# Patient Record
Sex: MALE | Race: OTHER | HISPANIC OR LATINO | URBAN - METROPOLITAN AREA
[De-identification: names, ages, dates, MRNs, and addresses within clinical notes are randomized per-mention and may not be internally consistent; named-entity substitution may affect disease eponyms.]

---

## 2019-04-14 ENCOUNTER — EMERGENCY (EMERGENCY)
Facility: HOSPITAL | Age: 34
LOS: 1 days | Discharge: HOME | End: 2019-04-14
Attending: STUDENT IN AN ORGANIZED HEALTH CARE EDUCATION/TRAINING PROGRAM
Payer: SELF-PAY

## 2019-04-14 VITALS
HEIGHT: 71 IN | DIASTOLIC BLOOD PRESSURE: 82 MMHG | RESPIRATION RATE: 19 BRPM | HEART RATE: 97 BPM | WEIGHT: 315 LBS | TEMPERATURE: 99 F | OXYGEN SATURATION: 97 % | SYSTOLIC BLOOD PRESSURE: 133 MMHG

## 2019-04-14 PROCEDURE — 99283 EMERGENCY DEPT VISIT LOW MDM: CPT

## 2019-04-14 RX ORDER — CYCLOBENZAPRINE HYDROCHLORIDE 10 MG/1
10 TABLET, FILM COATED ORAL ONCE
Qty: 0 | Refills: 0 | Status: COMPLETED | OUTPATIENT
Start: 2019-04-14 | End: 2019-04-14

## 2019-04-14 RX ORDER — CYCLOBENZAPRINE HYDROCHLORIDE 10 MG/1
1 TABLET, FILM COATED ORAL
Qty: 10 | Refills: 0 | OUTPATIENT
Start: 2019-04-14

## 2019-04-14 RX ORDER — IBUPROFEN 200 MG
600 TABLET ORAL ONCE
Qty: 0 | Refills: 0 | Status: COMPLETED | OUTPATIENT
Start: 2019-04-14 | End: 2019-04-14

## 2019-04-14 RX ORDER — IBUPROFEN 200 MG
1 TABLET ORAL
Qty: 20 | Refills: 0 | OUTPATIENT
Start: 2019-04-14

## 2019-04-14 RX ADMIN — Medication 600 MILLIGRAM(S): at 14:40

## 2019-04-14 RX ADMIN — CYCLOBENZAPRINE HYDROCHLORIDE 10 MILLIGRAM(S): 10 TABLET, FILM COATED ORAL at 14:40

## 2019-04-14 NOTE — ED PROVIDER NOTE - CHPI ED SYMPTOMS NEG
No fever, No vomiting, No difficulty urinating, No difficulty with bowel movements, no b/l leg weakness/no numbness

## 2019-04-14 NOTE — ED PROVIDER NOTE - OBJECTIVE STATEMENT
32 y/o M patient w/ no significant PMHx and no significant PSHx presents to the ED with lower back pain for x1 week. 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 Patient denies any trauma. Patient denies difficulty urinating or with bowel movements, fever, vomiting, b/l leg weakness, b/l leg numbness, and any other complaints. NKDA.

## 2019-04-14 NOTE — ED ADULT NURSE NOTE - OBJECTIVE STATEMENT
AOX4 +ambulatory patient reports lower back pain x 1 week s/p lifting at work. No trauma no gi gu problems

## 2019-04-14 NOTE — ED PROVIDER NOTE - MUSCULOSKELETAL, MLM
Spine appears normal, range of motion is not limited, no muscle or joint tenderness, No midline Cervical, Thoracic or lumbar tenderness to palpation

## 2019-04-14 NOTE — ED PROVIDER NOTE - PROGRESS NOTE DETAILS
patient neurologically intact. ambulatory. pain improved. given medication, return precaution and instructed to f.u pmd

## 2019-04-14 NOTE — ED PROVIDER NOTE - CLINICAL SUMMARY MEDICAL DECISION MAKING FREE TEXT BOX
Patient with back pain worse with movement. Patient neurovascularly intact, no signs of cauda equina, no leg weakness, numbness. Likely MSK pain, no red flags. Will treat pain and reassess.

## 2019-05-17 ENCOUNTER — EMERGENCY (EMERGENCY)
Facility: HOSPITAL | Age: 34
LOS: 1 days | Discharge: HOME | End: 2019-05-17
Attending: EMERGENCY MEDICINE
Payer: SELF-PAY

## 2019-05-17 VITALS
RESPIRATION RATE: 20 BRPM | HEIGHT: 71 IN | TEMPERATURE: 100 F | DIASTOLIC BLOOD PRESSURE: 90 MMHG | SYSTOLIC BLOOD PRESSURE: 150 MMHG | HEART RATE: 136 BPM | WEIGHT: 315 LBS | OXYGEN SATURATION: 94 %

## 2019-05-17 PROCEDURE — 99284 EMERGENCY DEPT VISIT MOD MDM: CPT

## 2019-05-17 RX ORDER — SODIUM CHLORIDE 9 MG/ML
2000 INJECTION INTRAMUSCULAR; INTRAVENOUS; SUBCUTANEOUS ONCE
Refills: 0 | Status: COMPLETED | OUTPATIENT
Start: 2019-05-17 | End: 2019-05-17

## 2019-05-17 RX ORDER — ACETAMINOPHEN 500 MG
1000 TABLET ORAL ONCE
Refills: 0 | Status: COMPLETED | OUTPATIENT
Start: 2019-05-17 | End: 2019-05-18

## 2019-05-17 RX ORDER — KETOROLAC TROMETHAMINE 30 MG/ML
30 SYRINGE (ML) INJECTION ONCE
Refills: 0 | Status: DISCONTINUED | OUTPATIENT
Start: 2019-05-17 | End: 2019-05-17

## 2019-05-18 VITALS
RESPIRATION RATE: 19 BRPM | OXYGEN SATURATION: 98 % | DIASTOLIC BLOOD PRESSURE: 62 MMHG | SYSTOLIC BLOOD PRESSURE: 121 MMHG | TEMPERATURE: 100 F | HEART RATE: 112 BPM

## 2019-05-18 PROBLEM — Z78.9 OTHER SPECIFIED HEALTH STATUS: Chronic | Status: ACTIVE | Noted: 2019-04-14

## 2019-05-18 LAB
ALBUMIN SERPL ELPH-MCNC: 3.4 G/DL — LOW (ref 3.5–5)
ALP SERPL-CCNC: 69 U/L — SIGNIFICANT CHANGE UP (ref 40–120)
ALT FLD-CCNC: 32 U/L DA — SIGNIFICANT CHANGE UP (ref 10–60)
ANION GAP SERPL CALC-SCNC: 6 MMOL/L — SIGNIFICANT CHANGE UP (ref 5–17)
AST SERPL-CCNC: 27 U/L — SIGNIFICANT CHANGE UP (ref 10–40)
BASOPHILS # BLD AUTO: 0 K/UL — SIGNIFICANT CHANGE UP (ref 0–0.2)
BASOPHILS NFR BLD AUTO: 0 % — SIGNIFICANT CHANGE UP (ref 0–2)
BILIRUB SERPL-MCNC: 0.7 MG/DL — SIGNIFICANT CHANGE UP (ref 0.2–1.2)
BUN SERPL-MCNC: 11 MG/DL — SIGNIFICANT CHANGE UP (ref 7–18)
CALCIUM SERPL-MCNC: 8.7 MG/DL — SIGNIFICANT CHANGE UP (ref 8.4–10.5)
CHLORIDE SERPL-SCNC: 105 MMOL/L — SIGNIFICANT CHANGE UP (ref 96–108)
CO2 SERPL-SCNC: 27 MMOL/L — SIGNIFICANT CHANGE UP (ref 22–31)
CREAT SERPL-MCNC: 1.07 MG/DL — SIGNIFICANT CHANGE UP (ref 0.5–1.3)
EOSINOPHIL # BLD AUTO: 0 K/UL — SIGNIFICANT CHANGE UP (ref 0–0.5)
EOSINOPHIL NFR BLD AUTO: 0 % — SIGNIFICANT CHANGE UP (ref 0–6)
GLUCOSE SERPL-MCNC: 102 MG/DL — HIGH (ref 70–99)
HCT VFR BLD CALC: 43.6 % — SIGNIFICANT CHANGE UP (ref 39–50)
HGB BLD-MCNC: 13.8 G/DL — SIGNIFICANT CHANGE UP (ref 13–17)
LYMPHOCYTES # BLD AUTO: 0.27 K/UL — LOW (ref 1–3.3)
LYMPHOCYTES # BLD AUTO: 1 % — LOW (ref 13–44)
MCHC RBC-ENTMCNC: 30.9 PG — SIGNIFICANT CHANGE UP (ref 27–34)
MCHC RBC-ENTMCNC: 31.7 GM/DL — LOW (ref 32–36)
MCV RBC AUTO: 97.5 FL — SIGNIFICANT CHANGE UP (ref 80–100)
MONOCYTES # BLD AUTO: 0.54 K/UL — SIGNIFICANT CHANGE UP (ref 0–0.9)
MONOCYTES NFR BLD AUTO: 2 % — SIGNIFICANT CHANGE UP (ref 2–14)
NEUTROPHILS # BLD AUTO: 25.15 K/UL — HIGH (ref 1.8–7.4)
NEUTROPHILS NFR BLD AUTO: 90 % — HIGH (ref 43–77)
PLATELET # BLD AUTO: 202 K/UL — SIGNIFICANT CHANGE UP (ref 150–400)
POTASSIUM SERPL-MCNC: 3.9 MMOL/L — SIGNIFICANT CHANGE UP (ref 3.5–5.3)
POTASSIUM SERPL-SCNC: 3.9 MMOL/L — SIGNIFICANT CHANGE UP (ref 3.5–5.3)
PROT SERPL-MCNC: 7.8 G/DL — SIGNIFICANT CHANGE UP (ref 6–8.3)
RBC # BLD: 4.47 M/UL — SIGNIFICANT CHANGE UP (ref 4.2–5.8)
RBC # FLD: 12.8 % — SIGNIFICANT CHANGE UP (ref 10.3–14.5)
SODIUM SERPL-SCNC: 138 MMOL/L — SIGNIFICANT CHANGE UP (ref 135–145)
WBC # BLD: 27.04 K/UL — HIGH (ref 3.8–10.5)
WBC # FLD AUTO: 27.04 K/UL — HIGH (ref 3.8–10.5)

## 2019-05-18 PROCEDURE — 99284 EMERGENCY DEPT VISIT MOD MDM: CPT | Mod: 25

## 2019-05-18 PROCEDURE — 87040 BLOOD CULTURE FOR BACTERIA: CPT

## 2019-05-18 PROCEDURE — 96365 THER/PROPH/DIAG IV INF INIT: CPT

## 2019-05-18 PROCEDURE — 36415 COLL VENOUS BLD VENIPUNCTURE: CPT

## 2019-05-18 PROCEDURE — 85027 COMPLETE CBC AUTOMATED: CPT

## 2019-05-18 PROCEDURE — 96366 THER/PROPH/DIAG IV INF ADDON: CPT

## 2019-05-18 PROCEDURE — 80053 COMPREHEN METABOLIC PANEL: CPT

## 2019-05-18 PROCEDURE — 96375 TX/PRO/DX INJ NEW DRUG ADDON: CPT

## 2019-05-18 RX ADMIN — Medication 30 MILLIGRAM(S): at 00:44

## 2019-05-18 RX ADMIN — Medication 400 MILLIGRAM(S): at 00:44

## 2019-05-18 RX ADMIN — SODIUM CHLORIDE 2000 MILLILITER(S): 9 INJECTION INTRAMUSCULAR; INTRAVENOUS; SUBCUTANEOUS at 04:17

## 2019-05-18 RX ADMIN — SODIUM CHLORIDE 2000 MILLILITER(S): 9 INJECTION INTRAMUSCULAR; INTRAVENOUS; SUBCUTANEOUS at 00:20

## 2019-05-18 RX ADMIN — Medication 30 MILLIGRAM(S): at 01:33

## 2019-05-18 RX ADMIN — Medication 1000 MILLIGRAM(S): at 04:17

## 2019-05-18 RX ADMIN — Medication 900 MILLIGRAM(S): at 01:34

## 2019-05-18 RX ADMIN — Medication 100 MILLIGRAM(S): at 00:44

## 2019-05-18 NOTE — ED PROVIDER NOTE - CLINICAL SUMMARY MEDICAL DECISION MAKING FREE TEXT BOX
33 year old male with LLE cellulitis. febrile. tachycardic. PE as above.  labs are significant for leukocytosis. otherwise unremarkable. given dose of clinda and pain control. vitals improved. had detailed convo and offered admission but at this time the patient would like to trial dc home with PO abx. advised strict return precautions if has persistent fevers, worsening pain or if erythema spreads. f/u with PMD in 2 days.

## 2019-05-18 NOTE — ED PROVIDER NOTE - PHYSICAL EXAMINATION
small area of erythema to left anterior tibia that is warm and ttp. LLE N/V intact.  pt ambulatory in ed with steady gait.

## 2019-05-18 NOTE — ED PROVIDER NOTE - OBJECTIVE STATEMENT
33 year old male PMH HTN coming in with redness and pain to LLE over tibia. states similar to prior cellulitis. symptoms started 2 days ago and were associated with fever starting tonight. says last time he had this is was worse and [facility]. denies all other complaints.

## 2019-05-18 NOTE — ED ADULT NURSE NOTE - OBJECTIVE STATEMENT
presents to ed c/o Left lower leg infection.febrile.bld.drawn and  sent to lab. IV ABT clindamycin given as  ordered.

## 2019-05-18 NOTE — ED ADULT NURSE NOTE - NSIMPLEMENTINTERV_GEN_ALL_ED
Implemented All Universal Safety Interventions:  [place] to call system. Call bell, personal items and telephone within reach. Instruct patient to call for assistance. Room bathroom lighting operational. Non-slip footwear when patient is off stretcher. Physically safe environment: no spills, clutter or unnecessary equipment. Stretcher in lowest position, wheels locked, appropriate side rails in place.

## 2019-05-23 LAB
CULTURE RESULTS: SIGNIFICANT CHANGE UP
CULTURE RESULTS: SIGNIFICANT CHANGE UP
SPECIMEN SOURCE: SIGNIFICANT CHANGE UP
SPECIMEN SOURCE: SIGNIFICANT CHANGE UP

## 2019-08-02 PROBLEM — Z00.00 ENCOUNTER FOR PREVENTIVE HEALTH EXAMINATION: Status: ACTIVE | Noted: 2019-08-02

## 2019-08-05 ENCOUNTER — APPOINTMENT (OUTPATIENT)
Dept: ORTHOPEDIC SURGERY | Facility: CLINIC | Age: 34
End: 2019-08-05
Payer: COMMERCIAL

## 2019-08-05 VITALS
WEIGHT: 315 LBS | HEIGHT: 72 IN | SYSTOLIC BLOOD PRESSURE: 165 MMHG | DIASTOLIC BLOOD PRESSURE: 90 MMHG | BODY MASS INDEX: 42.66 KG/M2 | HEART RATE: 91 BPM

## 2019-08-05 DIAGNOSIS — L03.119 CELLULITIS OF UNSPECIFIED PART OF LIMB: ICD-10-CM

## 2019-08-05 DIAGNOSIS — Z78.9 OTHER SPECIFIED HEALTH STATUS: ICD-10-CM

## 2019-08-05 DIAGNOSIS — Z86.79 PERSONAL HISTORY OF OTHER DISEASES OF THE CIRCULATORY SYSTEM: ICD-10-CM

## 2019-08-05 PROCEDURE — 99204 OFFICE O/P NEW MOD 45 MIN: CPT

## 2019-08-05 PROCEDURE — 73564 X-RAY EXAM KNEE 4 OR MORE: CPT | Mod: LT

## 2019-08-05 RX ORDER — DICLOFENAC SODIUM 50 MG/1
50 TABLET, DELAYED RELEASE ORAL
Qty: 60 | Refills: 1 | Status: ACTIVE | COMMUNITY
Start: 2019-08-05 | End: 1900-01-01
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
## 2019-08-05 NOTE — PHYSICAL EXAM
[de-identified] : Physical Examination\par General: well nourished, in no acute distress, alert and oriented to person, place and time\par Psychiatric: normal mood and affect, no abnormal movements or speech patterns\par Eyes: vision intact - glasses\par Throat: no thyromegaly\par Lymph: no enlarged nodes, no lymphedema in extremity\par Respiratory: no wheezing, no shortness of breath with ambulation\par Cardiac: no cardiac leg swelling, 2+ peripheral pulses\par Neurology: normal gross sensation in extremities to light touch\par Abdomen: soft, non-tender, tympanic, no masses\par \par Musculoskeletal Examination\par Ambulation	+ antalgic gait, + assistive devices\par The patient's body habitus synovectomy limited the patient's examination\par Knee			Right			Left\par General\par      Swelling/Deformity	normal			normal	\par      Skin			normal			normal\par      Erythema		-			-\par      Standing Alignment	neutral			neutral\par      Effusion		none			small/moderate\par Range of Motion\par      Hip			full painless ROM		full painless ROM\par      Knee Flexion		100			80\par      Knee Extension	0			0\par Patella\par      J Sign		-			-\par      Quad Medial/Lateral	1/1 1/1\par      Apprehension		-			-\par      Christopher's		-			-\par      Grind Sign		-			-\par      Crepitus		-			-\par Palpation\par      Medial Joint Line	-			+\par      Medial Fem Condyle	-			+ epicondyle and tibial plateau\par      Lateral Joint Line	-			-\par      Quad Tendon		-			-\par      Patella Tendon	-			-\par      Medial Patella		-			-\par      Lateral Patella 	-			-\par      Posterior Knee	-			-\par Ligamentous\par      Varus @ 0° / 30°	-/-			-/-\par      Valgus @ 0° / 30°	-/-			+ no end pt / + no end pt\par      Lachman		-&			-&\par      Pivot Shift		-&			-&\par      Anterior Drawer	-&			-&\par      Posterior Drawer	-&			-&\par Meniscus\par      Zee		-			+ pain\par      Flexion Pinch		-			+ pain\par Strength Examination/Atrophy\par      Hip Flexors 		5+			5+\par      Quadriceps		5+			4+\par      Hamstring		5+			4+\par      Tibialis Anterior	5+			5+\par      Achilles/Soleus	5+			5+\par Sensation\par      Deep Peroneal	normal			normal\par      Superficial Peroneal 	normal			normal\par      Sural  		normal			normal\par      Posterior Tibial 	normal			normal\par      Saphneous 		normal			normal\par Pulses\par      DP			2+			2+\par  [de-identified] : 5 views of the affected Left knee (standing AP, flexing standing AP, 30degree flexed lateral, 0degree lateral, sunrise view)\par were ordered, obtained and evaluated by myself today and\par demonstrate:\par There is mild lateral weightbearing asymmetric narrowing\par Small osteophytic lipping\par Small to trace suprapatellar effusion\par Trace osteophytes with mild patellofemoral joint space loss without evidence of tilt [or] subluxation on sunrise view\par Normal soft tissue density\par Otherwise normal osseous bone structure without fracture or dislocation\par Except for a possible traction avulsion fracture injury at the tibial plateau in the area of the MCL attachment and bony ossicles at the tip of the tibial spines post related to ACL PCL injury

## 2019-09-04 ENCOUNTER — APPOINTMENT (OUTPATIENT)
Dept: ORTHOPEDIC SURGERY | Facility: CLINIC | Age: 34
End: 2019-09-04

## 2019-09-06 ENCOUNTER — APPOINTMENT (OUTPATIENT)
Dept: ORTHOPEDIC SURGERY | Facility: CLINIC | Age: 34
End: 2019-09-06

## 2019-10-07 ENCOUNTER — RX RENEWAL (OUTPATIENT)
Age: 34
End: 2019-10-07

## 2019-10-30 ENCOUNTER — APPOINTMENT (OUTPATIENT)
Dept: ORTHOPEDIC SURGERY | Facility: CLINIC | Age: 34
End: 2019-10-30

## 2019-11-06 ENCOUNTER — APPOINTMENT (OUTPATIENT)
Dept: ORTHOPEDIC SURGERY | Facility: CLINIC | Age: 34
End: 2019-11-06

## 2020-01-03 ENCOUNTER — APPOINTMENT (OUTPATIENT)
Dept: ORTHOPEDIC SURGERY | Facility: CLINIC | Age: 35
End: 2020-01-03
Payer: COMMERCIAL

## 2020-01-03 VITALS — WEIGHT: 315 LBS | BODY MASS INDEX: 44.1 KG/M2 | HEIGHT: 71 IN

## 2020-01-03 DIAGNOSIS — M23.92 UNSPECIFIED INTERNAL DERANGEMENT OF LEFT KNEE: ICD-10-CM

## 2020-01-03 DIAGNOSIS — S83.412A SPRAIN OF MEDIAL COLLATERAL LIGAMENT OF LEFT KNEE, INITIAL ENCOUNTER: ICD-10-CM

## 2020-01-03 DIAGNOSIS — M94.262 CHONDROMALACIA, LEFT KNEE: ICD-10-CM

## 2020-01-03 DIAGNOSIS — S83.105A UNSPECIFIED DISLOCATION OF LEFT KNEE, INITIAL ENCOUNTER: ICD-10-CM

## 2020-01-03 DIAGNOSIS — S89.92XA UNSPECIFIED INJURY OF LEFT LOWER LEG, INITIAL ENCOUNTER: ICD-10-CM

## 2020-01-03 PROCEDURE — 99214 OFFICE O/P EST MOD 30 MIN: CPT

## 2020-01-03 RX ORDER — DICLOFENAC SODIUM 50 MG/1
50 TABLET, DELAYED RELEASE ORAL
Qty: 30 | Refills: 1 | Status: ACTIVE | COMMUNITY
Start: 2020-01-03 | End: 1900-01-01
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
## 2020-01-03 NOTE — PHYSICAL EXAM
[de-identified] : Physical Examination\par General: well nourished, in no acute distress, alert and oriented to person, place and time\par Psychiatric: normal mood and affect, no abnormal movements or speech patterns\par Eyes: vision intact - glasses\par Throat: no thyromegaly\par Lymph: no enlarged nodes, no lymphedema in extremity\par Respiratory: no wheezing, no shortness of breath with ambulation\par Cardiac: no cardiac leg swelling, 2+ peripheral pulses\par Neurology: normal gross sensation in extremities to light touch\par Abdomen: soft, non-tender, tympanic, no masses\par \par Musculoskeletal Examination\par Ambulation	+ antalgic gait, + assistive devices\par The patient's body habitus synovectomy limited the patient's examination\par Knee			Right			Left\par General\par      Swelling/Deformity	normal			normal	\par      Skin			normal			normal\par      Erythema		-			-\par      Standing Alignment	neutral			neutral\par      Effusion		none			small/moderate\par Range of Motion\par      Hip			full painless ROM		full painless ROM\par      Knee Flexion		100			90\par      Knee Extension	0			0\par Patella\par      J Sign		-			-\par      Quad Medial/Lateral	1/1 1/1\par      Apprehension		-			-\par      Christopher's		-			-\par      Grind Sign		-			-\par      Crepitus		-			-\par Palpation\par      Medial Joint Line	-			+\par      Medial Fem Condyle	-			+ epicondyle and tibial plateau\par      Lateral Joint Line	-			-\par      Quad Tendon		-			-\par      Patella Tendon	-			-\par      Medial Patella		-			-\par      Lateral Patella 	-			-\par      Posterior Knee	-			-\par Ligamentous\par      Varus @ 0° / 30°	-/-			-/-\par      Valgus @ 0° / 30°	-/-			+ no end pt / + no end pt\par      Lachman		-&			+ 2B\par      Pivot Shift		-&			-&\par      Anterior Drawer	-&			+\par      Posterior Drawer	-&			+\par Meniscus\par      Zee		-			+ pain\par      Flexion Pinch		-			+ pain\par Strength Examination/Atrophy\par      Hip Flexors 		5+			5+\par      Quadriceps		5+			4+\par      Hamstring		5+			4+\par      Tibialis Anterior	5+			5+\par      Achilles/Soleus	5+			5+\par Sensation\par      Deep Peroneal	normal			normal\par      Superficial Peroneal 	normal			normal\par      Sural  		normal			normal\par      Posterior Tibial 	normal			normal\par      Saphneous 		normal			normal\par Pulses\par      DP			2+			2+\par  [de-identified] : 5 views of the affected Left knee (standing AP, flexing standing AP, 30degree flexed lateral, 0degree lateral, sunrise view)\par 8-2019\par demonstrate:\par There is mild lateral weightbearing asymmetric narrowing\par Small osteophytic lipping\par Small to trace suprapatellar effusion\par Trace osteophytes with mild patellofemoral joint space loss without evidence of tilt [or] subluxation on sunrise view\par Normal soft tissue density\par Otherwise normal osseous bone structure without fracture or dislocation\par Except for a possible traction avulsion fracture injury at the tibial plateau in the area of the MCL attachment and bony ossicles at the tip of the tibial spines post related to ACL PCL injury
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
## 2020-01-04 ENCOUNTER — HOSPITAL ENCOUNTER (INPATIENT)
Dept: HOSPITAL 74 - JER | Age: 35
LOS: 3 days | Discharge: HOME | DRG: 720 | End: 2020-01-07
Attending: FAMILY MEDICINE | Admitting: FAMILY MEDICINE
Payer: COMMERCIAL

## 2020-01-04 VITALS — BODY MASS INDEX: 55.3 KG/M2

## 2020-01-04 DIAGNOSIS — L03.116: ICD-10-CM

## 2020-01-04 DIAGNOSIS — R50.9: ICD-10-CM

## 2020-01-04 DIAGNOSIS — A41.9: Primary | ICD-10-CM

## 2020-01-04 DIAGNOSIS — R00.0: ICD-10-CM

## 2020-01-04 DIAGNOSIS — D72.829: ICD-10-CM

## 2020-01-04 DIAGNOSIS — E66.01: ICD-10-CM

## 2020-01-04 DIAGNOSIS — I10: ICD-10-CM

## 2020-01-04 LAB
ALBUMIN SERPL-MCNC: 3.6 G/DL (ref 3.4–5)
ALP SERPL-CCNC: 76 U/L (ref 45–117)
ALT SERPL-CCNC: 32 U/L (ref 13–61)
ANION GAP SERPL CALC-SCNC: 8 MMOL/L (ref 8–16)
ANISOCYTOSIS BLD QL: 0
APPEARANCE UR: CLEAR
APTT BLD: 30.7 SECONDS (ref 25.2–36.5)
AST SERPL-CCNC: 21 U/L (ref 15–37)
BACTERIA # UR AUTO: 79 /HPF
BASO STIPL BLD QL SMEAR: 0
BASOPHILS # BLD: 0.5 % (ref 0–2)
BILIRUB SERPL-MCNC: 0.6 MG/DL (ref 0.2–1)
BILIRUB UR STRIP.AUTO-MCNC: NEGATIVE MG/DL
BUN SERPL-MCNC: 8.1 MG/DL (ref 7–18)
CALCIUM SERPL-MCNC: 9.3 MG/DL (ref 8.5–10.1)
CASTS URNS QL MICRO: 13 /LPF (ref 0–8)
CHLORIDE SERPL-SCNC: 102 MMOL/L (ref 98–107)
CO2 SERPL-SCNC: 27 MMOL/L (ref 21–32)
COLOR UR: YELLOW
CREAT SERPL-MCNC: 1.1 MG/DL (ref 0.55–1.3)
DACRYOCYTES BLD QL SMEAR: 0
DEPRECATED RDW RBC AUTO: 13.1 % (ref 11.9–15.9)
DOHLE BOD BLD QL SMEAR: 0
EOSINOPHIL # BLD: 0 % (ref 0–4.5)
EPITH CASTS URNS QL MICRO: 1.9 /HPF
GLUCOSE SERPL-MCNC: 104 MG/DL (ref 74–106)
HCT VFR BLD CALC: 43.3 % (ref 35.4–49)
HELMET CELLS BLD QL SMEAR: 0
HGB BLD-MCNC: 14.4 GM/DL (ref 11.7–16.9)
HOWELL-JOLLY BOD BLD QL SMEAR: 0
INR BLD: 1.32 (ref 0.83–1.09)
KETONES UR QL STRIP: (no result)
LEUKOCYTE ESTERASE UR QL STRIP.AUTO: (no result)
LYMPHOCYTES # BLD: 5 % (ref 8–40)
MACROCYTES BLD QL: 0
MAGNESIUM SERPL-MCNC: 2 MG/DL (ref 1.8–2.4)
MCH RBC QN AUTO: 31.4 PG (ref 25.7–33.7)
MCHC RBC AUTO-ENTMCNC: 33.2 G/DL (ref 32–35.9)
MCV RBC: 94.7 FL (ref 80–96)
MONOCYTES # BLD AUTO: 5.7 % (ref 3.8–10.2)
NEUTROPHILS # BLD: 88.8 % (ref 42.8–82.8)
NITRITE UR QL STRIP: NEGATIVE
OVALOCYTES BLD QL SMEAR: 0
PH BLDV: 7.38 [PH] (ref 7.31–7.41)
PH UR: 5.5 [PH] (ref 5–8)
PHOSPHATE SERPL-MCNC: 2.8 MG/DL (ref 2.5–4.9)
PLATELET # BLD AUTO: 246 K/MM3 (ref 134–434)
PLATELET BLD QL SMEAR: NORMAL
PMV BLD: 9.3 FL (ref 7.5–11.1)
POTASSIUM SERPLBLD-SCNC: 4 MMOL/L (ref 3.5–5.1)
PROT SERPL-MCNC: 7.8 G/DL (ref 6.4–8.2)
PROT UR QL STRIP: (no result)
PROT UR QL STRIP: NEGATIVE
PT PNL PPP: 15.6 SEC (ref 9.7–13)
RBC # BLD AUTO: 3 /HPF (ref 0–4)
RBC # BLD AUTO: 4.57 M/MM3 (ref 4–5.6)
ROULEAUX BLD QL SMEAR: 0
SICKLE CELLS BLD QL SMEAR: 0
SODIUM SERPL-SCNC: 137 MMOL/L (ref 136–145)
SP GR UR: 1.02 (ref 1.01–1.03)
TARGETS BLD QL SMEAR: 0
TOXIC GRANULES BLD QL SMEAR: 0
UROBILINOGEN UR STRIP-MCNC: 1 MG/DL (ref 0.2–1)
VENOUS PC02: 50.1 MMHG (ref 38–52)
VENOUS PO2: < 49 MMHG (ref 28–48)
WBC # BLD AUTO: 33.2 K/MM3 (ref 4–10)
WBC # UR AUTO: 7 /HPF (ref 0–5)

## 2020-01-04 RX ADMIN — HEPARIN SODIUM SCH UNIT: 5000 INJECTION, SOLUTION INTRAVENOUS; SUBCUTANEOUS at 23:21

## 2020-01-05 LAB
ALBUMIN SERPL-MCNC: 3.2 G/DL (ref 3.4–5)
ALP SERPL-CCNC: 69 U/L (ref 45–117)
ALT SERPL-CCNC: 28 U/L (ref 13–61)
ANION GAP SERPL CALC-SCNC: 5 MMOL/L (ref 8–16)
AST SERPL-CCNC: 17 U/L (ref 15–37)
BILIRUB SERPL-MCNC: 0.6 MG/DL (ref 0.2–1)
BUN SERPL-MCNC: 8.1 MG/DL (ref 7–18)
CALCIUM SERPL-MCNC: 9 MG/DL (ref 8.5–10.1)
CHLORIDE SERPL-SCNC: 104 MMOL/L (ref 98–107)
CO2 SERPL-SCNC: 31 MMOL/L (ref 21–32)
CREAT SERPL-MCNC: 0.7 MG/DL (ref 0.55–1.3)
DEPRECATED RDW RBC AUTO: 13 % (ref 11.9–15.9)
GLUCOSE SERPL-MCNC: 92 MG/DL (ref 74–106)
HCT VFR BLD CALC: 42.6 % (ref 35.4–49)
HGB BLD-MCNC: 14.3 GM/DL (ref 11.7–16.9)
MCH RBC QN AUTO: 31.9 PG (ref 25.7–33.7)
MCHC RBC AUTO-ENTMCNC: 33.5 G/DL (ref 32–35.9)
MCV RBC: 95.1 FL (ref 80–96)
PLATELET # BLD AUTO: 205 K/MM3 (ref 134–434)
PMV BLD: 9.5 FL (ref 7.5–11.1)
POTASSIUM SERPLBLD-SCNC: 3.8 MMOL/L (ref 3.5–5.1)
PROT SERPL-MCNC: 7.2 G/DL (ref 6.4–8.2)
RBC # BLD AUTO: 4.48 M/MM3 (ref 4–5.6)
SODIUM SERPL-SCNC: 141 MMOL/L (ref 136–145)
WBC # BLD AUTO: 13.3 K/MM3 (ref 4–10)

## 2020-01-05 RX ADMIN — HEPARIN SODIUM SCH UNIT: 5000 INJECTION, SOLUTION INTRAVENOUS; SUBCUTANEOUS at 10:05

## 2020-01-05 RX ADMIN — HEPARIN SODIUM SCH UNIT: 5000 INJECTION, SOLUTION INTRAVENOUS; SUBCUTANEOUS at 21:57

## 2020-01-05 RX ADMIN — CEFAZOLIN SODIUM SCH MLS/HR: 2 SOLUTION INTRAVENOUS at 20:29

## 2020-01-06 RX ADMIN — CEFAZOLIN SODIUM SCH MLS/HR: 2 SOLUTION INTRAVENOUS at 01:57

## 2020-01-06 RX ADMIN — HEPARIN SODIUM SCH UNIT: 5000 INJECTION, SOLUTION INTRAVENOUS; SUBCUTANEOUS at 09:45

## 2020-01-06 RX ADMIN — CEFAZOLIN SODIUM SCH MLS/HR: 2 SOLUTION INTRAVENOUS at 18:07

## 2020-01-06 RX ADMIN — CEFAZOLIN SODIUM SCH MLS/HR: 2 SOLUTION INTRAVENOUS at 09:45

## 2020-01-06 RX ADMIN — HEPARIN SODIUM SCH: 5000 INJECTION, SOLUTION INTRAVENOUS; SUBCUTANEOUS at 21:18

## 2020-01-06 RX ADMIN — LISINOPRIL SCH MG: 5 TABLET ORAL at 16:34

## 2020-01-07 VITALS — DIASTOLIC BLOOD PRESSURE: 67 MMHG | SYSTOLIC BLOOD PRESSURE: 110 MMHG | HEART RATE: 88 BPM | TEMPERATURE: 99.3 F

## 2020-01-07 LAB
ALBUMIN SERPL-MCNC: 3.2 G/DL (ref 3.4–5)
ALP SERPL-CCNC: 63 U/L (ref 45–117)
ALT SERPL-CCNC: 25 U/L (ref 13–61)
ANION GAP SERPL CALC-SCNC: 7 MMOL/L (ref 8–16)
AST SERPL-CCNC: 15 U/L (ref 15–37)
BASOPHILS # BLD: 0.9 % (ref 0–2)
BILIRUB SERPL-MCNC: 0.3 MG/DL (ref 0.2–1)
BUN SERPL-MCNC: 8.1 MG/DL (ref 7–18)
CALCIUM SERPL-MCNC: 8.8 MG/DL (ref 8.5–10.1)
CHLORIDE SERPL-SCNC: 104 MMOL/L (ref 98–107)
CO2 SERPL-SCNC: 27 MMOL/L (ref 21–32)
CREAT SERPL-MCNC: 0.7 MG/DL (ref 0.55–1.3)
DEPRECATED RDW RBC AUTO: 12.9 % (ref 11.9–15.9)
EOSINOPHIL # BLD: 1 % (ref 0–4.5)
GLUCOSE SERPL-MCNC: 89 MG/DL (ref 74–106)
HCT VFR BLD CALC: 40.9 % (ref 35.4–49)
HGB BLD-MCNC: 13.6 GM/DL (ref 11.7–16.9)
LYMPHOCYTES # BLD: 23.7 % (ref 8–40)
MCH RBC QN AUTO: 31.4 PG (ref 25.7–33.7)
MCHC RBC AUTO-ENTMCNC: 33.2 G/DL (ref 32–35.9)
MCV RBC: 94.6 FL (ref 80–96)
MONOCYTES # BLD AUTO: 8.3 % (ref 3.8–10.2)
NEUTROPHILS # BLD: 66.1 % (ref 42.8–82.8)
PLATELET # BLD AUTO: 234 K/MM3 (ref 134–434)
PMV BLD: 9.2 FL (ref 7.5–11.1)
POTASSIUM SERPLBLD-SCNC: 4 MMOL/L (ref 3.5–5.1)
PROT SERPL-MCNC: 7 G/DL (ref 6.4–8.2)
RBC # BLD AUTO: 4.33 M/MM3 (ref 4–5.6)
SODIUM SERPL-SCNC: 138 MMOL/L (ref 136–145)
WBC # BLD AUTO: 12.2 K/MM3 (ref 4–10)

## 2020-01-07 RX ADMIN — LISINOPRIL SCH MG: 5 TABLET ORAL at 11:11

## 2020-01-07 RX ADMIN — CEFAZOLIN SODIUM SCH MLS/HR: 2 SOLUTION INTRAVENOUS at 02:53

## 2020-01-07 RX ADMIN — HEPARIN SODIUM SCH UNIT: 5000 INJECTION, SOLUTION INTRAVENOUS; SUBCUTANEOUS at 11:11

## 2020-01-07 RX ADMIN — CEFAZOLIN SODIUM SCH MLS/HR: 2 SOLUTION INTRAVENOUS at 11:11

## 2020-08-25 ENCOUNTER — EMERGENCY (EMERGENCY)
Facility: HOSPITAL | Age: 35
LOS: 1 days | Discharge: HOME | End: 2020-08-25
Attending: EMERGENCY MEDICINE
Payer: COMMERCIAL

## 2020-08-25 VITALS
HEIGHT: 71 IN | HEART RATE: 122 BPM | SYSTOLIC BLOOD PRESSURE: 133 MMHG | DIASTOLIC BLOOD PRESSURE: 82 MMHG | TEMPERATURE: 98 F | RESPIRATION RATE: 22 BRPM | WEIGHT: 315 LBS | OXYGEN SATURATION: 96 %

## 2020-08-25 VITALS
RESPIRATION RATE: 20 BRPM | DIASTOLIC BLOOD PRESSURE: 84 MMHG | TEMPERATURE: 99 F | OXYGEN SATURATION: 99 % | HEART RATE: 90 BPM | SYSTOLIC BLOOD PRESSURE: 130 MMHG

## 2020-08-25 LAB
ALBUMIN SERPL ELPH-MCNC: 3.2 G/DL — LOW (ref 3.5–5)
ALP SERPL-CCNC: 70 U/L — SIGNIFICANT CHANGE UP (ref 40–120)
ALT FLD-CCNC: 45 U/L DA — SIGNIFICANT CHANGE UP (ref 10–60)
ANION GAP SERPL CALC-SCNC: 6 MMOL/L — SIGNIFICANT CHANGE UP (ref 5–17)
AST SERPL-CCNC: 30 U/L — SIGNIFICANT CHANGE UP (ref 10–40)
BASOPHILS # BLD AUTO: 0 K/UL — SIGNIFICANT CHANGE UP (ref 0–0.2)
BASOPHILS NFR BLD AUTO: 0 % — SIGNIFICANT CHANGE UP (ref 0–2)
BILIRUB SERPL-MCNC: 0.6 MG/DL — SIGNIFICANT CHANGE UP (ref 0.2–1.2)
BUN SERPL-MCNC: 9 MG/DL — SIGNIFICANT CHANGE UP (ref 7–18)
CALCIUM SERPL-MCNC: 9 MG/DL — SIGNIFICANT CHANGE UP (ref 8.4–10.5)
CHLORIDE SERPL-SCNC: 103 MMOL/L — SIGNIFICANT CHANGE UP (ref 96–108)
CO2 SERPL-SCNC: 26 MMOL/L — SIGNIFICANT CHANGE UP (ref 22–31)
CREAT SERPL-MCNC: 0.86 MG/DL — SIGNIFICANT CHANGE UP (ref 0.5–1.3)
EOSINOPHIL # BLD AUTO: 0 K/UL — SIGNIFICANT CHANGE UP (ref 0–0.5)
EOSINOPHIL NFR BLD AUTO: 0 % — SIGNIFICANT CHANGE UP (ref 0–6)
GLUCOSE SERPL-MCNC: 117 MG/DL — HIGH (ref 70–99)
HCT VFR BLD CALC: 43.8 % — SIGNIFICANT CHANGE UP (ref 39–50)
HGB BLD-MCNC: 14.3 G/DL — SIGNIFICANT CHANGE UP (ref 13–17)
LYMPHOCYTES # BLD AUTO: 0.92 K/UL — LOW (ref 1–3.3)
LYMPHOCYTES # BLD AUTO: 3 % — LOW (ref 13–44)
MCHC RBC-ENTMCNC: 32.1 PG — SIGNIFICANT CHANGE UP (ref 27–34)
MCHC RBC-ENTMCNC: 32.6 GM/DL — SIGNIFICANT CHANGE UP (ref 32–36)
MCV RBC AUTO: 98.4 FL — SIGNIFICANT CHANGE UP (ref 80–100)
MONOCYTES # BLD AUTO: 3.99 K/UL — HIGH (ref 0–0.9)
MONOCYTES NFR BLD AUTO: 13 % — SIGNIFICANT CHANGE UP (ref 2–14)
NEUTROPHILS # BLD AUTO: 25.81 K/UL — HIGH (ref 1.8–7.4)
NEUTROPHILS NFR BLD AUTO: 81 % — HIGH (ref 43–77)
PLATELET # BLD AUTO: 229 K/UL — SIGNIFICANT CHANGE UP (ref 150–400)
POTASSIUM SERPL-MCNC: 3.8 MMOL/L — SIGNIFICANT CHANGE UP (ref 3.5–5.3)
POTASSIUM SERPL-SCNC: 3.8 MMOL/L — SIGNIFICANT CHANGE UP (ref 3.5–5.3)
PROT SERPL-MCNC: 8.2 G/DL — SIGNIFICANT CHANGE UP (ref 6–8.3)
RBC # BLD: 4.45 M/UL — SIGNIFICANT CHANGE UP (ref 4.2–5.8)
RBC # FLD: 12.7 % — SIGNIFICANT CHANGE UP (ref 10.3–14.5)
SODIUM SERPL-SCNC: 135 MMOL/L — SIGNIFICANT CHANGE UP (ref 135–145)
TROPONIN I SERPL-MCNC: <0.015 NG/ML — SIGNIFICANT CHANGE UP (ref 0–0.04)
WBC # BLD: 30.73 K/UL — HIGH (ref 3.8–10.5)
WBC # FLD AUTO: 30.73 K/UL — HIGH (ref 3.8–10.5)

## 2020-08-25 PROCEDURE — 71046 X-RAY EXAM CHEST 2 VIEWS: CPT | Mod: 26

## 2020-08-25 PROCEDURE — 99283 EMERGENCY DEPT VISIT LOW MDM: CPT

## 2020-08-25 PROCEDURE — 36415 COLL VENOUS BLD VENIPUNCTURE: CPT

## 2020-08-25 PROCEDURE — 84484 ASSAY OF TROPONIN QUANT: CPT

## 2020-08-25 PROCEDURE — 85027 COMPLETE CBC AUTOMATED: CPT

## 2020-08-25 PROCEDURE — 96374 THER/PROPH/DIAG INJ IV PUSH: CPT

## 2020-08-25 PROCEDURE — 71046 X-RAY EXAM CHEST 2 VIEWS: CPT

## 2020-08-25 PROCEDURE — 99284 EMERGENCY DEPT VISIT MOD MDM: CPT | Mod: 25

## 2020-08-25 PROCEDURE — 80053 COMPREHEN METABOLIC PANEL: CPT

## 2020-08-25 PROCEDURE — 93005 ELECTROCARDIOGRAM TRACING: CPT

## 2020-08-25 RX ORDER — CEFTRIAXONE 500 MG/1
1000 INJECTION, POWDER, FOR SOLUTION INTRAMUSCULAR; INTRAVENOUS ONCE
Refills: 0 | Status: COMPLETED | OUTPATIENT
Start: 2020-08-25 | End: 2020-08-25

## 2020-08-25 RX ORDER — SODIUM CHLORIDE 9 MG/ML
1000 INJECTION INTRAMUSCULAR; INTRAVENOUS; SUBCUTANEOUS ONCE
Refills: 0 | Status: COMPLETED | OUTPATIENT
Start: 2020-08-25 | End: 2020-08-25

## 2020-08-25 RX ADMIN — CEFTRIAXONE 100 MILLIGRAM(S): 500 INJECTION, POWDER, FOR SOLUTION INTRAMUSCULAR; INTRAVENOUS at 08:00

## 2020-08-25 RX ADMIN — SODIUM CHLORIDE 1000 MILLILITER(S): 9 INJECTION INTRAMUSCULAR; INTRAVENOUS; SUBCUTANEOUS at 08:00

## 2020-08-25 NOTE — ED PROVIDER NOTE - PROGRESS NOTE DETAILS
Patient's blood work shows WBC of 30. Discussed at length benefits of admission for further eval. Patient requesting discharge home. Will arrange for outpatient follow up and abx at home. Patient in agreement with plan.

## 2020-08-25 NOTE — ED PROVIDER NOTE - PATIENT PORTAL LINK FT
You can access the FollowMyHealth Patient Portal offered by [facility] by registering at the following website: [email or web address]. By joining [facility]’s FollowMyHealth portal, you will also be able to view your health information using other applications (apps) compatible with our system.

## 2020-08-25 NOTE — ED PROVIDER NOTE - OBJECTIVE STATEMENT
Patient presenting to the ED with concerns for L lower leg cellulitis. Patient reports he has a history of recurring cellulitis and is having symptoms concerning for early infection. States he woke up with pain to the L lateral ankle and it feels swollen to him. States it was associated with chills and he was concerned for a fever. He states when this occurs he develops chest pain. Denies diaphoresis, nausea, vomiting. Denies falls or injuries. h/o HTN. No other issues.

## 2020-08-25 NOTE — ED ADULT NURSE NOTE - OBJECTIVE STATEMENT
presents to the ER c/o pain , warmth to Left lower leg at 4am w/ subjective fevers took some Tylenol . pt reports has chronic cellulitis to same Left leg states " always go to the ER as soon as he feels flare up of cellulitis " denies trauma/injury . also c/o chest pain this morning

## 2021-03-06 ENCOUNTER — HOSPITAL ENCOUNTER (INPATIENT)
Dept: HOSPITAL 74 - JER | Age: 36
LOS: 3 days | Discharge: HOME | DRG: 383 | End: 2021-03-09
Attending: FAMILY MEDICINE | Admitting: HOSPITALIST
Payer: COMMERCIAL

## 2021-03-06 VITALS — BODY MASS INDEX: 60.4 KG/M2

## 2021-03-06 DIAGNOSIS — B96.20: ICD-10-CM

## 2021-03-06 DIAGNOSIS — N40.0: ICD-10-CM

## 2021-03-06 DIAGNOSIS — J06.9: ICD-10-CM

## 2021-03-06 DIAGNOSIS — B35.3: ICD-10-CM

## 2021-03-06 DIAGNOSIS — N39.0: ICD-10-CM

## 2021-03-06 DIAGNOSIS — E66.01: ICD-10-CM

## 2021-03-06 DIAGNOSIS — R31.9: ICD-10-CM

## 2021-03-06 DIAGNOSIS — D72.829: ICD-10-CM

## 2021-03-06 DIAGNOSIS — L03.116: Primary | ICD-10-CM

## 2021-03-06 DIAGNOSIS — I10: ICD-10-CM

## 2021-03-06 LAB
ALBUMIN SERPL-MCNC: 3.6 G/DL (ref 3.4–5)
ALP SERPL-CCNC: 76 U/L (ref 45–117)
ALT SERPL-CCNC: 31 U/L (ref 13–61)
ANION GAP SERPL CALC-SCNC: 5 MMOL/L (ref 8–16)
APPEARANCE UR: CLEAR
AST SERPL-CCNC: 24 U/L (ref 15–37)
BACTERIA # UR AUTO: 160 /UL (ref 0–1359)
BASOPHILS # BLD: 0.2 % (ref 0–2)
BILIRUB SERPL-MCNC: 0.4 MG/DL (ref 0.2–1)
BILIRUB UR STRIP.AUTO-MCNC: NEGATIVE MG/DL
BUN SERPL-MCNC: 11.9 MG/DL (ref 7–18)
CALCIUM SERPL-MCNC: 9.3 MG/DL (ref 8.5–10.1)
CASTS URNS QL MICRO: 30 /UL (ref 0–3.1)
CHLORIDE SERPL-SCNC: 105 MMOL/L (ref 98–107)
CO2 SERPL-SCNC: 27 MMOL/L (ref 21–32)
COLOR UR: YELLOW
CREAT SERPL-MCNC: 0.9 MG/DL (ref 0.55–1.3)
DEPRECATED RDW RBC AUTO: 12.9 % (ref 11.9–15.9)
EOSINOPHIL # BLD: 0.8 % (ref 0–4.5)
EPITH CASTS URNS QL MICRO: 11 /UL (ref 0–25.1)
GLUCOSE SERPL-MCNC: 98 MG/DL (ref 74–106)
HCT VFR BLD CALC: 42.9 % (ref 35.4–49)
HGB BLD-MCNC: 14.6 GM/DL (ref 11.7–16.9)
KETONES UR QL STRIP: (no result)
LEUKOCYTE ESTERASE UR QL STRIP.AUTO: (no result)
LYMPHOCYTES # BLD: 21.3 % (ref 8–40)
MCH RBC QN AUTO: 31.7 PG (ref 25.7–33.7)
MCHC RBC AUTO-ENTMCNC: 34 G/DL (ref 32–35.9)
MCV RBC: 93.3 FL (ref 80–96)
MONOCYTES # BLD AUTO: 8 % (ref 3.8–10.2)
NEUTROPHILS # BLD: 69.7 % (ref 42.8–82.8)
NITRITE UR QL STRIP: NEGATIVE
PH UR: 5.5 [PH] (ref 5–8)
PLATELET # BLD AUTO: 258 K/MM3 (ref 134–434)
PMV BLD: 9 FL (ref 7.5–11.1)
POTASSIUM SERPLBLD-SCNC: 4 MMOL/L (ref 3.5–5.1)
PROT SERPL-MCNC: 8.1 G/DL (ref 6.4–8.2)
PROT UR QL STRIP: NEGATIVE
PROT UR QL STRIP: NEGATIVE
RBC # BLD AUTO: 31 /UL (ref 0–23.9)
RBC # BLD AUTO: 4.6 M/MM3 (ref 4–5.6)
SODIUM SERPL-SCNC: 137 MMOL/L (ref 136–145)
SP GR UR: 1.02 (ref 1.01–1.03)
UROBILINOGEN UR STRIP-MCNC: 0.2 MG/DL (ref 0.2–1)
WBC # BLD AUTO: 15.6 K/MM3 (ref 4–10)
WBC # UR AUTO: 348 /UL (ref 0–25.8)

## 2021-03-06 PROCEDURE — C9803 HOPD COVID-19 SPEC COLLECT: HCPCS

## 2021-03-06 PROCEDURE — U0003 INFECTIOUS AGENT DETECTION BY NUCLEIC ACID (DNA OR RNA); SEVERE ACUTE RESPIRATORY SYNDROME CORONAVIRUS 2 (SARS-COV-2) (CORONAVIRUS DISEASE [COVID-19]), AMPLIFIED PROBE TECHNIQUE, MAKING USE OF HIGH THROUGHPUT TECHNOLOGIES AS DESCRIBED BY CMS-2020-01-R: HCPCS

## 2021-03-07 LAB
ALBUMIN SERPL-MCNC: 3.1 G/DL (ref 3.4–5)
ALP SERPL-CCNC: 62 U/L (ref 45–117)
ALT SERPL-CCNC: 27 U/L (ref 13–61)
ANION GAP SERPL CALC-SCNC: 5 MMOL/L (ref 8–16)
AST SERPL-CCNC: 16 U/L (ref 15–37)
BASOPHILS # BLD: 1.2 % (ref 0–2)
BILIRUB SERPL-MCNC: 0.6 MG/DL (ref 0.2–1)
BUN SERPL-MCNC: 9.2 MG/DL (ref 7–18)
CALCIUM SERPL-MCNC: 8.7 MG/DL (ref 8.5–10.1)
CHLORIDE SERPL-SCNC: 107 MMOL/L (ref 98–107)
CO2 SERPL-SCNC: 27 MMOL/L (ref 21–32)
CREAT SERPL-MCNC: 0.7 MG/DL (ref 0.55–1.3)
DEPRECATED RDW RBC AUTO: 13.1 % (ref 11.9–15.9)
EOSINOPHIL # BLD: 1.5 % (ref 0–4.5)
GLUCOSE SERPL-MCNC: 95 MG/DL (ref 74–106)
HCT VFR BLD CALC: 38.7 % (ref 35.4–49)
HGB BLD-MCNC: 13.1 GM/DL (ref 11.7–16.9)
LYMPHOCYTES # BLD: 23.1 % (ref 8–40)
MCH RBC QN AUTO: 32 PG (ref 25.7–33.7)
MCHC RBC AUTO-ENTMCNC: 33.9 G/DL (ref 32–35.9)
MCV RBC: 94.3 FL (ref 80–96)
MONOCYTES # BLD AUTO: 9.4 % (ref 3.8–10.2)
NEUTROPHILS # BLD: 64.8 % (ref 42.8–82.8)
PLATELET # BLD AUTO: 212 K/MM3 (ref 134–434)
PMV BLD: 9.3 FL (ref 7.5–11.1)
POTASSIUM SERPLBLD-SCNC: 4 MMOL/L (ref 3.5–5.1)
PROT SERPL-MCNC: 6.8 G/DL (ref 6.4–8.2)
RBC # BLD AUTO: 4.11 M/MM3 (ref 4–5.6)
SODIUM SERPL-SCNC: 139 MMOL/L (ref 136–145)
WBC # BLD AUTO: 11.4 K/MM3 (ref 4–10)

## 2021-03-07 RX ADMIN — BENZOCAINE AND MENTHOL PRN EACH: 15; 3.6 LOZENGE ORAL at 10:48

## 2021-03-07 RX ADMIN — LISINOPRIL SCH MG: 5 TABLET ORAL at 10:48

## 2021-03-07 RX ADMIN — CEFAZOLIN SODIUM SCH MLS/HR: 2 SOLUTION INTRAVENOUS at 18:16

## 2021-03-07 RX ADMIN — TOLNAFTATE SCH APPLIC: 1 POWDER TOPICAL at 22:20

## 2021-03-07 RX ADMIN — CEFAZOLIN SODIUM SCH MLS/HR: 2 SOLUTION INTRAVENOUS at 10:47

## 2021-03-07 RX ADMIN — BENZOCAINE AND MENTHOL PRN EACH: 15; 3.6 LOZENGE ORAL at 06:15

## 2021-03-08 LAB
ALBUMIN SERPL-MCNC: 3.2 G/DL (ref 3.4–5)
ALP SERPL-CCNC: 62 U/L (ref 45–117)
ALT SERPL-CCNC: 28 U/L (ref 13–61)
ANION GAP SERPL CALC-SCNC: 4 MMOL/L (ref 8–16)
AST SERPL-CCNC: 21 U/L (ref 15–37)
BILIRUB SERPL-MCNC: 0.5 MG/DL (ref 0.2–1)
BUN SERPL-MCNC: 9.2 MG/DL (ref 7–18)
CALCIUM SERPL-MCNC: 8.8 MG/DL (ref 8.5–10.1)
CHLORIDE SERPL-SCNC: 105 MMOL/L (ref 98–107)
CO2 SERPL-SCNC: 29 MMOL/L (ref 21–32)
CREAT SERPL-MCNC: 0.8 MG/DL (ref 0.55–1.3)
DEPRECATED RDW RBC AUTO: 13.2 % (ref 11.9–15.9)
GLUCOSE SERPL-MCNC: 110 MG/DL (ref 74–106)
HCT VFR BLD CALC: 39.1 % (ref 35.4–49)
HGB BLD-MCNC: 13.4 GM/DL (ref 11.7–16.9)
HIV 1+2 AB+HIV1 P24 AG SERPL QL IA: NEGATIVE
MCH RBC QN AUTO: 32.1 PG (ref 25.7–33.7)
MCHC RBC AUTO-ENTMCNC: 34.1 G/DL (ref 32–35.9)
MCV RBC: 93.9 FL (ref 80–96)
PLATELET # BLD AUTO: 246 K/MM3 (ref 134–434)
PMV BLD: 8.8 FL (ref 7.5–11.1)
POTASSIUM SERPLBLD-SCNC: 4 MMOL/L (ref 3.5–5.1)
PROT SERPL-MCNC: 7.1 G/DL (ref 6.4–8.2)
RBC # BLD AUTO: 4.16 M/MM3 (ref 4–5.6)
SODIUM SERPL-SCNC: 137 MMOL/L (ref 136–145)
WBC # BLD AUTO: 11.1 K/MM3 (ref 4–10)

## 2021-03-08 RX ADMIN — LISINOPRIL SCH MG: 5 TABLET ORAL at 10:01

## 2021-03-08 RX ADMIN — TOLNAFTATE SCH APPLIC: 1 POWDER TOPICAL at 10:02

## 2021-03-08 RX ADMIN — TOLNAFTATE SCH APPLIC: 1 POWDER TOPICAL at 22:53

## 2021-03-08 RX ADMIN — CEFAZOLIN SODIUM SCH MLS/HR: 2 SOLUTION INTRAVENOUS at 10:01

## 2021-03-08 RX ADMIN — CEFAZOLIN SODIUM SCH MLS/HR: 2 SOLUTION INTRAVENOUS at 02:10

## 2021-03-08 RX ADMIN — CEFTRIAXONE SODIUM SCH MLS/HR: 2 INJECTION, POWDER, FOR SOLUTION INTRAMUSCULAR; INTRAVENOUS at 17:17

## 2021-03-08 RX ADMIN — BENZOCAINE AND MENTHOL PRN EACH: 15; 3.6 LOZENGE ORAL at 21:56

## 2021-03-09 VITALS — DIASTOLIC BLOOD PRESSURE: 81 MMHG | SYSTOLIC BLOOD PRESSURE: 132 MMHG | HEART RATE: 86 BPM

## 2021-03-09 VITALS — TEMPERATURE: 97.8 F

## 2021-03-09 RX ADMIN — LISINOPRIL SCH MG: 5 TABLET ORAL at 10:05

## 2021-03-09 RX ADMIN — TOLNAFTATE SCH APPLIC: 1 POWDER TOPICAL at 10:05

## 2021-03-09 RX ADMIN — CEFTRIAXONE SODIUM SCH MLS/HR: 2 INJECTION, POWDER, FOR SOLUTION INTRAMUSCULAR; INTRAVENOUS at 09:58

## 2021-04-30 ENCOUNTER — HOSPITAL ENCOUNTER (INPATIENT)
Dept: HOSPITAL 74 - JER | Age: 36
LOS: 4 days | Discharge: HOME | DRG: 383 | End: 2021-05-04
Attending: FAMILY MEDICINE | Admitting: HOSPITALIST
Payer: COMMERCIAL

## 2021-04-30 VITALS — BODY MASS INDEX: 60.2 KG/M2

## 2021-04-30 DIAGNOSIS — E66.01: ICD-10-CM

## 2021-04-30 DIAGNOSIS — B35.3: ICD-10-CM

## 2021-04-30 DIAGNOSIS — L03.116: Primary | ICD-10-CM

## 2021-04-30 DIAGNOSIS — I10: ICD-10-CM

## 2021-04-30 LAB
ALBUMIN SERPL-MCNC: 3.6 G/DL (ref 3.4–5)
ALP SERPL-CCNC: 67 U/L (ref 45–117)
ALT SERPL-CCNC: 36 U/L (ref 13–61)
ANION GAP SERPL CALC-SCNC: 8 MMOL/L (ref 8–16)
ANISOCYTOSIS BLD QL: 0
APTT BLD: 26.8 SECONDS (ref 25.2–36.5)
AST SERPL-CCNC: 27 U/L (ref 15–37)
BASE EXCESS BLDV CALC-SCNC: -1 MMOL/L (ref -2–2)
BASOPHILS # BLD: 0.6 % (ref 0–2)
BILIRUB SERPL-MCNC: 0.6 MG/DL (ref 0.2–1)
BUN SERPL-MCNC: 9.9 MG/DL (ref 7–18)
CALCIUM SERPL-MCNC: 9 MG/DL (ref 8.5–10.1)
CHLORIDE SERPL-SCNC: 103 MMOL/L (ref 98–107)
CO2 SERPL-SCNC: 25 MMOL/L (ref 21–32)
CREAT SERPL-MCNC: 1 MG/DL (ref 0.55–1.3)
DEPRECATED RDW RBC AUTO: 13.7 % (ref 11.9–15.9)
EOSINOPHIL # BLD: 0 % (ref 0–4.5)
ERYTHROCYTE [SEDIMENTATION RATE] IN BLOOD BY WESTERGREN METHOD: 25 MM/HR (ref 0–10)
GLUCOSE SERPL-MCNC: 91 MG/DL (ref 74–106)
HCT VFR BLD CALC: 43 % (ref 35.4–49)
HGB BLD-MCNC: 14.6 GM/DL (ref 11.7–16.9)
INR BLD: 1.13 (ref 0.83–1.09)
LYMPHOCYTES # BLD: 2.6 % (ref 8–40)
MACROCYTES BLD QL: 0
MAGNESIUM SERPL-MCNC: 1.8 MG/DL (ref 1.8–2.4)
MCH RBC QN AUTO: 32.2 PG (ref 25.7–33.7)
MCHC RBC AUTO-ENTMCNC: 33.9 G/DL (ref 32–35.9)
MCV RBC: 95 FL (ref 80–96)
MONOCYTES # BLD AUTO: 4.9 % (ref 3.8–10.2)
NEUTROPHILS # BLD: 91.9 % (ref 42.8–82.8)
PCO2 BLDV: 34.9 MMHG (ref 38–52)
PH BLDV: 7.43 [PH] (ref 7.31–7.41)
PLATELET # BLD AUTO: 216 K/MM3 (ref 134–434)
PLATELET BLD QL SMEAR: NORMAL
PMV BLD: 9.7 FL (ref 7.5–11.1)
PROT SERPL-MCNC: 7.6 G/DL (ref 6.4–8.2)
PT PNL PPP: 13.8 SEC (ref 9.7–13)
RBC # BLD AUTO: 4.53 M/MM3 (ref 4–5.6)
SAO2 % BLDV: 86.5 % (ref 70–80)
SODIUM SERPL-SCNC: 137 MMOL/L (ref 136–145)
WBC # BLD AUTO: 34.9 K/MM3 (ref 4–10)

## 2021-04-30 PROCEDURE — G0480 DRUG TEST DEF 1-7 CLASSES: HCPCS

## 2021-04-30 PROCEDURE — U0005 INFEC AGEN DETEC AMPLI PROBE: HCPCS

## 2021-04-30 PROCEDURE — U0003 INFECTIOUS AGENT DETECTION BY NUCLEIC ACID (DNA OR RNA); SEVERE ACUTE RESPIRATORY SYNDROME CORONAVIRUS 2 (SARS-COV-2) (CORONAVIRUS DISEASE [COVID-19]), AMPLIFIED PROBE TECHNIQUE, MAKING USE OF HIGH THROUGHPUT TECHNOLOGIES AS DESCRIBED BY CMS-2020-01-R: HCPCS

## 2021-04-30 PROCEDURE — C9803 HOPD COVID-19 SPEC COLLECT: HCPCS

## 2021-04-30 RX ADMIN — ENOXAPARIN SODIUM SCH: 40 INJECTION SUBCUTANEOUS at 21:29

## 2021-04-30 RX ADMIN — SODIUM CHLORIDE SCH MLS/HR: 9 INJECTION, SOLUTION INTRAVENOUS at 21:08

## 2021-05-01 LAB
ANION GAP SERPL CALC-SCNC: 5 MMOL/L (ref 8–16)
ANISOCYTOSIS BLD QL: 0
APPEARANCE UR: (no result)
BACTERIA # UR AUTO: 52 /UL (ref 0–1359)
BASO STIPL BLD QL SMEAR: 0
BASOPHILS # BLD: 0.5 % (ref 0–2)
BILIRUB UR STRIP.AUTO-MCNC: NEGATIVE MG/DL
BUN SERPL-MCNC: 9.3 MG/DL (ref 7–18)
CALCIUM SERPL-MCNC: 8.2 MG/DL (ref 8.5–10.1)
CASTS URNS QL MICRO: 38 /UL (ref 0–3.1)
CHLORIDE SERPL-SCNC: 106 MMOL/L (ref 98–107)
CO2 SERPL-SCNC: 27 MMOL/L (ref 21–32)
COLOR UR: (no result)
CREAT SERPL-MCNC: 0.7 MG/DL (ref 0.55–1.3)
DACRYOCYTES BLD QL SMEAR: 0
DEPRECATED RDW RBC AUTO: 13.4 % (ref 11.9–15.9)
DOHLE BOD BLD QL SMEAR: 0
EOSINOPHIL # BLD: 0.3 % (ref 0–4.5)
EPITH CASTS URNS QL MICRO: >36 /UL (ref 0–25.1)
GLUCOSE SERPL-MCNC: 95 MG/DL (ref 74–106)
HCT VFR BLD CALC: 39.7 % (ref 35.4–49)
HELMET CELLS BLD QL SMEAR: 0
HGB BLD-MCNC: 13.5 GM/DL (ref 11.7–16.9)
HOWELL-JOLLY BOD BLD QL SMEAR: 0
KETONES UR QL STRIP: (no result)
LACTATE SERPL-MCNC: 3.2 MMOL/L (ref 0.4–2)
LEUKOCYTE ESTERASE UR QL STRIP.AUTO: (no result)
LYMPHOCYTES # BLD: 5.6 % (ref 8–40)
MACROCYTES BLD QL: 0
MAGNESIUM SERPL-MCNC: 2 MG/DL (ref 1.8–2.4)
MCH RBC QN AUTO: 32.3 PG (ref 25.7–33.7)
MCHC RBC AUTO-ENTMCNC: 34 G/DL (ref 32–35.9)
MCV RBC: 95 FL (ref 80–96)
MONOCYTES # BLD AUTO: 4.8 % (ref 3.8–10.2)
NEUTROPHILS # BLD: 88.8 % (ref 42.8–82.8)
NITRITE UR QL STRIP: NEGATIVE
OVALOCYTES BLD QL SMEAR: 0
PH UR: 5.5 [PH] (ref 5–8)
PHOSPHATE SERPL-MCNC: 2.8 MG/DL (ref 2.5–4.9)
PLATELET # BLD AUTO: 191 K/MM3 (ref 134–434)
PLATELET BLD QL SMEAR: NORMAL
PMV BLD: 9.7 FL (ref 7.5–11.1)
PROT UR QL STRIP: (no result)
PROT UR QL STRIP: NEGATIVE
RBC # BLD AUTO: 20 /UL (ref 0–23.9)
RBC # BLD AUTO: 4.18 M/MM3 (ref 4–5.6)
ROULEAUX BLD QL SMEAR: 0
SICKLE CELLS BLD QL SMEAR: 0
SODIUM SERPL-SCNC: 138 MMOL/L (ref 136–145)
SP GR UR: 1.04 (ref 1.01–1.03)
TARGETS BLD QL SMEAR: 0
TOXIC GRANULES BLD QL SMEAR: 0
UROBILINOGEN UR STRIP-MCNC: 1 MG/DL (ref 0.2–1)
WBC # BLD AUTO: 25.2 K/MM3 (ref 4–10)
WBC # UR AUTO: 334 /UL (ref 0–25.8)

## 2021-05-01 RX ADMIN — CEFAZOLIN SODIUM SCH MLS/HR: 2 SOLUTION INTRAVENOUS at 14:05

## 2021-05-01 RX ADMIN — ACETAMINOPHEN PRN MG: 325 TABLET ORAL at 18:32

## 2021-05-01 RX ADMIN — LISINOPRIL SCH MG: 5 TABLET ORAL at 09:11

## 2021-05-01 RX ADMIN — ACETAMINOPHEN PRN MG: 325 TABLET ORAL at 02:41

## 2021-05-01 RX ADMIN — ENOXAPARIN SODIUM SCH: 40 INJECTION SUBCUTANEOUS at 09:15

## 2021-05-01 RX ADMIN — CEFAZOLIN SODIUM SCH MLS/HR: 2 SOLUTION INTRAVENOUS at 17:09

## 2021-05-01 RX ADMIN — VANCOMYCIN SCH MLS/HR: 1.5 INJECTION, SOLUTION INTRAVENOUS at 18:29

## 2021-05-01 RX ADMIN — ACETAMINOPHEN PRN MG: 325 TABLET ORAL at 12:21

## 2021-05-01 RX ADMIN — SODIUM CHLORIDE SCH MLS/HR: 9 INJECTION, SOLUTION INTRAVENOUS at 17:12

## 2021-05-02 LAB
ANION GAP SERPL CALC-SCNC: 6 MMOL/L (ref 8–16)
BUN SERPL-MCNC: 7.4 MG/DL (ref 7–18)
CALCIUM SERPL-MCNC: 8.2 MG/DL (ref 8.5–10.1)
CHLORIDE SERPL-SCNC: 107 MMOL/L (ref 98–107)
CO2 SERPL-SCNC: 26 MMOL/L (ref 21–32)
CREAT SERPL-MCNC: 0.7 MG/DL (ref 0.55–1.3)
DEPRECATED RDW RBC AUTO: 13.5 % (ref 11.9–15.9)
GLUCOSE SERPL-MCNC: 96 MG/DL (ref 74–106)
HCT VFR BLD CALC: 39.7 % (ref 35.4–49)
HGB BLD-MCNC: 13.3 GM/DL (ref 11.7–16.9)
MCH RBC QN AUTO: 32.2 PG (ref 25.7–33.7)
MCHC RBC AUTO-ENTMCNC: 33.4 G/DL (ref 32–35.9)
MCV RBC: 96.3 FL (ref 80–96)
PLATELET # BLD AUTO: 200 K/MM3 (ref 134–434)
PMV BLD: 9.5 FL (ref 7.5–11.1)
RBC # BLD AUTO: 4.12 M/MM3 (ref 4–5.6)
SODIUM SERPL-SCNC: 139 MMOL/L (ref 136–145)
WBC # BLD AUTO: 14 K/MM3 (ref 4–10)

## 2021-05-02 RX ADMIN — SODIUM CHLORIDE SCH: 9 INJECTION, SOLUTION INTRAVENOUS at 01:55

## 2021-05-02 RX ADMIN — CEFAZOLIN SODIUM SCH MLS/HR: 2 SOLUTION INTRAVENOUS at 01:54

## 2021-05-02 RX ADMIN — ACETAMINOPHEN PRN MG: 325 TABLET ORAL at 09:26

## 2021-05-02 RX ADMIN — ENOXAPARIN SODIUM SCH: 40 INJECTION SUBCUTANEOUS at 09:28

## 2021-05-02 RX ADMIN — SODIUM CHLORIDE SCH MLS/HR: 9 INJECTION, SOLUTION INTRAVENOUS at 23:07

## 2021-05-02 RX ADMIN — CEFAZOLIN SODIUM SCH MLS/HR: 2 SOLUTION INTRAVENOUS at 09:25

## 2021-05-02 RX ADMIN — ACETAMINOPHEN PRN MG: 325 TABLET ORAL at 03:23

## 2021-05-02 RX ADMIN — CEFAZOLIN SODIUM SCH MLS/HR: 2 SOLUTION INTRAVENOUS at 17:02

## 2021-05-02 RX ADMIN — LISINOPRIL SCH MG: 5 TABLET ORAL at 09:25

## 2021-05-02 RX ADMIN — VANCOMYCIN SCH MLS/HR: 1.5 INJECTION, SOLUTION INTRAVENOUS at 06:41

## 2021-05-02 RX ADMIN — ACETAMINOPHEN AND CODEINE PHOSPHATE PRN TAB: 300; 30 TABLET ORAL at 17:05

## 2021-05-02 RX ADMIN — ACETAMINOPHEN AND CODEINE PHOSPHATE PRN TAB: 300; 30 TABLET ORAL at 23:07

## 2021-05-03 LAB
ALBUMIN SERPL-MCNC: 3.1 G/DL (ref 3.4–5)
ALP SERPL-CCNC: 61 U/L (ref 45–117)
ALT SERPL-CCNC: 22 U/L (ref 13–61)
ANION GAP SERPL CALC-SCNC: 5 MMOL/L (ref 8–16)
AST SERPL-CCNC: 18 U/L (ref 15–37)
BASOPHILS # BLD: 1 % (ref 0–2)
BILIRUB SERPL-MCNC: 0.5 MG/DL (ref 0.2–1)
BUN SERPL-MCNC: 8.8 MG/DL (ref 7–18)
CALCIUM SERPL-MCNC: 8.9 MG/DL (ref 8.5–10.1)
CHLORIDE SERPL-SCNC: 105 MMOL/L (ref 98–107)
CO2 SERPL-SCNC: 29 MMOL/L (ref 21–32)
CREAT SERPL-MCNC: 0.7 MG/DL (ref 0.55–1.3)
DEPRECATED RDW RBC AUTO: 13.5 % (ref 11.9–15.9)
EOSINOPHIL # BLD: 1.2 % (ref 0–4.5)
GLUCOSE SERPL-MCNC: 98 MG/DL (ref 74–106)
HCT VFR BLD CALC: 40.8 % (ref 35.4–49)
HGB BLD-MCNC: 14 GM/DL (ref 11.7–16.9)
LYMPHOCYTES # BLD: 24.1 % (ref 8–40)
MCH RBC QN AUTO: 32.8 PG (ref 25.7–33.7)
MCHC RBC AUTO-ENTMCNC: 34.4 G/DL (ref 32–35.9)
MCV RBC: 95.3 FL (ref 80–96)
MONOCYTES # BLD AUTO: 8.5 % (ref 3.8–10.2)
NEUTROPHILS # BLD: 65.2 % (ref 42.8–82.8)
PLATELET # BLD AUTO: 223 K/MM3 (ref 134–434)
PMV BLD: 9 FL (ref 7.5–11.1)
PROT SERPL-MCNC: 7 G/DL (ref 6.4–8.2)
RBC # BLD AUTO: 4.28 M/MM3 (ref 4–5.6)
SODIUM SERPL-SCNC: 139 MMOL/L (ref 136–145)
WBC # BLD AUTO: 10.3 K/MM3 (ref 4–10)

## 2021-05-03 RX ADMIN — SODIUM CHLORIDE SCH MLS/HR: 9 INJECTION, SOLUTION INTRAVENOUS at 13:30

## 2021-05-03 RX ADMIN — CEFAZOLIN SODIUM SCH MLS/HR: 2 SOLUTION INTRAVENOUS at 17:33

## 2021-05-03 RX ADMIN — CEFAZOLIN SODIUM SCH MLS/HR: 2 SOLUTION INTRAVENOUS at 09:22

## 2021-05-03 RX ADMIN — ENOXAPARIN SODIUM SCH MG: 40 INJECTION SUBCUTANEOUS at 09:22

## 2021-05-03 RX ADMIN — CEFAZOLIN SODIUM SCH MLS/HR: 2 SOLUTION INTRAVENOUS at 01:22

## 2021-05-03 RX ADMIN — LISINOPRIL SCH MG: 5 TABLET ORAL at 09:23

## 2021-05-04 VITALS — TEMPERATURE: 98.5 F | SYSTOLIC BLOOD PRESSURE: 163 MMHG | DIASTOLIC BLOOD PRESSURE: 91 MMHG | HEART RATE: 88 BPM

## 2021-05-04 RX ADMIN — SODIUM CHLORIDE SCH MLS/HR: 9 INJECTION, SOLUTION INTRAVENOUS at 01:37

## 2021-05-04 RX ADMIN — CEFAZOLIN SODIUM SCH MLS/HR: 2 SOLUTION INTRAVENOUS at 09:21

## 2021-05-04 RX ADMIN — CEFAZOLIN SODIUM SCH MLS/HR: 2 SOLUTION INTRAVENOUS at 01:37

## 2021-05-04 RX ADMIN — LISINOPRIL SCH MG: 5 TABLET ORAL at 09:21

## 2021-05-04 RX ADMIN — ENOXAPARIN SODIUM SCH MG: 40 INJECTION SUBCUTANEOUS at 09:21
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
## 2021-08-30 ENCOUNTER — HOSPITAL ENCOUNTER (INPATIENT)
Dept: HOSPITAL 74 - JER | Age: 36
LOS: 4 days | Discharge: HOME | DRG: 383 | End: 2021-09-03
Attending: FAMILY MEDICINE | Admitting: HOSPITALIST
Payer: COMMERCIAL

## 2021-08-30 VITALS — BODY MASS INDEX: 64.3 KG/M2

## 2021-08-30 DIAGNOSIS — R60.9: ICD-10-CM

## 2021-08-30 DIAGNOSIS — D72.829: ICD-10-CM

## 2021-08-30 DIAGNOSIS — F17.200: ICD-10-CM

## 2021-08-30 DIAGNOSIS — E66.01: ICD-10-CM

## 2021-08-30 DIAGNOSIS — I10: ICD-10-CM

## 2021-08-30 DIAGNOSIS — L03.116: Primary | ICD-10-CM

## 2021-08-30 DIAGNOSIS — R00.0: ICD-10-CM

## 2021-08-30 DIAGNOSIS — R50.9: ICD-10-CM

## 2021-08-30 LAB
ALBUMIN SERPL-MCNC: 3 G/DL (ref 3.4–5)
ALP SERPL-CCNC: 71 U/L (ref 45–117)
ALT SERPL-CCNC: 29 U/L (ref 13–61)
ANION GAP SERPL CALC-SCNC: 7 MMOL/L (ref 8–16)
ANISOCYTOSIS BLD QL: 0
AST SERPL-CCNC: 18 U/L (ref 15–37)
BASO STIPL BLD QL SMEAR: 0
BILIRUB DIRECT SERPL-MCNC: 243 U/L (ref 87–246)
BILIRUB SERPL-MCNC: 0.6 MG/DL (ref 0.2–1)
BUN SERPL-MCNC: 11.7 MG/DL (ref 7–18)
CALCIUM SERPL-MCNC: 8.7 MG/DL (ref 8.5–10.1)
CHLORIDE SERPL-SCNC: 105 MMOL/L (ref 98–107)
CO2 SERPL-SCNC: 27 MMOL/L (ref 21–32)
CREAT SERPL-MCNC: 0.9 MG/DL (ref 0.55–1.3)
DACRYOCYTES BLD QL SMEAR: 0
DEPRECATED RDW RBC AUTO: 13.6 % (ref 11.9–15.9)
DOHLE BOD BLD QL SMEAR: 0
GLUCOSE SERPL-MCNC: 113 MG/DL (ref 74–106)
HCT VFR BLD CALC: 40.9 % (ref 35.4–49)
HELMET CELLS BLD QL SMEAR: 0
HGB BLD-MCNC: 13.7 GM/DL (ref 11.7–16.9)
HOWELL-JOLLY BOD BLD QL SMEAR: 0
MACROCYTES BLD QL: 0
MCH RBC QN AUTO: 31.4 PG (ref 25.7–33.7)
MCHC RBC AUTO-ENTMCNC: 33.5 G/DL (ref 32–35.9)
MCV RBC: 93.9 FL (ref 80–96)
OVALOCYTES BLD QL SMEAR: 0
PLATELET # BLD AUTO: 218 10^3/UL (ref 134–434)
PLATELET BLD QL SMEAR: NORMAL
PMV BLD: 8.8 FL (ref 7.5–11.1)
PROT SERPL-MCNC: 7.4 G/DL (ref 6.4–8.2)
RBC # BLD AUTO: 4.35 M/MM3 (ref 4–5.6)
ROULEAUX BLD QL SMEAR: 0
SICKLE CELLS BLD QL SMEAR: 0
SODIUM SERPL-SCNC: 139 MMOL/L (ref 136–145)
TARGETS BLD QL SMEAR: 0
TOXIC GRANULES BLD QL SMEAR: 0
WBC # BLD AUTO: 23 K/MM3 (ref 4–10)

## 2021-08-30 PROCEDURE — C9803 HOPD COVID-19 SPEC COLLECT: HCPCS

## 2021-08-30 PROCEDURE — U0005 INFEC AGEN DETEC AMPLI PROBE: HCPCS

## 2021-08-30 PROCEDURE — U0003 INFECTIOUS AGENT DETECTION BY NUCLEIC ACID (DNA OR RNA); SEVERE ACUTE RESPIRATORY SYNDROME CORONAVIRUS 2 (SARS-COV-2) (CORONAVIRUS DISEASE [COVID-19]), AMPLIFIED PROBE TECHNIQUE, MAKING USE OF HIGH THROUGHPUT TECHNOLOGIES AS DESCRIBED BY CMS-2020-01-R: HCPCS

## 2021-08-30 RX ADMIN — KETOCONAZOLE SCH TUBE: 20 CREAM TOPICAL at 21:30

## 2021-08-30 RX ADMIN — ENOXAPARIN SODIUM SCH MG: 40 INJECTION SUBCUTANEOUS at 21:30

## 2021-08-30 RX ADMIN — CEFAZOLIN SCH MLS/HR: 1 INJECTION, POWDER, FOR SOLUTION INTRAVENOUS at 21:30

## 2021-08-31 LAB
ALBUMIN SERPL-MCNC: 2.7 G/DL (ref 3.4–5)
ALP SERPL-CCNC: 59 U/L (ref 45–117)
ALT SERPL-CCNC: 31 U/L (ref 13–61)
ANION GAP SERPL CALC-SCNC: 5 MMOL/L (ref 8–16)
AST SERPL-CCNC: 22 U/L (ref 15–37)
BILIRUB DIRECT SERPL-MCNC: 181 U/L (ref 87–246)
BILIRUB SERPL-MCNC: 0.6 MG/DL (ref 0.2–1)
BUN SERPL-MCNC: 9.5 MG/DL (ref 7–18)
CALCIUM SERPL-MCNC: 8.4 MG/DL (ref 8.5–10.1)
CHLORIDE SERPL-SCNC: 106 MMOL/L (ref 98–107)
CO2 SERPL-SCNC: 27 MMOL/L (ref 21–32)
CREAT SERPL-MCNC: 0.6 MG/DL (ref 0.55–1.3)
DEPRECATED RDW RBC AUTO: 13.3 % (ref 11.9–15.9)
GLUCOSE SERPL-MCNC: 119 MG/DL (ref 74–106)
HCT VFR BLD CALC: 39.6 % (ref 35.4–49)
HGB BLD-MCNC: 13.4 GM/DL (ref 11.7–16.9)
MAGNESIUM SERPL-MCNC: 2.4 MG/DL (ref 1.8–2.4)
MCH RBC QN AUTO: 31.9 PG (ref 25.7–33.7)
MCHC RBC AUTO-ENTMCNC: 34 G/DL (ref 32–35.9)
MCV RBC: 93.9 FL (ref 80–96)
PHOSPHATE SERPL-MCNC: 2.9 MG/DL (ref 2.5–4.9)
PLATELET # BLD AUTO: 196 10^3/UL (ref 134–434)
PMV BLD: 8.5 FL (ref 7.5–11.1)
PROT SERPL-MCNC: 6.1 G/DL (ref 6.4–8.2)
RBC # BLD AUTO: 4.21 M/MM3 (ref 4–5.6)
SODIUM SERPL-SCNC: 138 MMOL/L (ref 136–145)
WBC # BLD AUTO: 14.7 K/MM3 (ref 4–10)

## 2021-08-31 RX ADMIN — CEFAZOLIN SCH MLS/HR: 1 INJECTION, POWDER, FOR SOLUTION INTRAVENOUS at 18:05

## 2021-08-31 RX ADMIN — ENOXAPARIN SODIUM SCH: 40 INJECTION SUBCUTANEOUS at 22:05

## 2021-08-31 RX ADMIN — CEFAZOLIN SCH MLS/HR: 1 INJECTION, POWDER, FOR SOLUTION INTRAVENOUS at 01:45

## 2021-08-31 RX ADMIN — ACETAMINOPHEN PRN MG: 325 TABLET ORAL at 08:42

## 2021-08-31 RX ADMIN — ENOXAPARIN SODIUM SCH: 40 INJECTION SUBCUTANEOUS at 09:59

## 2021-08-31 RX ADMIN — ENOXAPARIN SODIUM SCH MG: 40 INJECTION SUBCUTANEOUS at 22:03

## 2021-08-31 RX ADMIN — KETOCONAZOLE SCH APPLIC: 20 CREAM TOPICAL at 11:36

## 2021-08-31 RX ADMIN — LISINOPRIL SCH MG: 5 TABLET ORAL at 09:57

## 2021-08-31 RX ADMIN — CEFAZOLIN SCH MLS/HR: 1 INJECTION, POWDER, FOR SOLUTION INTRAVENOUS at 11:36

## 2021-09-01 LAB
BASOPHILS # BLD: 1.1 % (ref 0–2)
DEPRECATED RDW RBC AUTO: 13.5 % (ref 11.9–15.9)
EOSINOPHIL # BLD: 1.3 % (ref 0–4.5)
HCT VFR BLD CALC: 43.6 % (ref 35.4–49)
HGB BLD-MCNC: 14.6 GM/DL (ref 11.7–16.9)
LYMPHOCYTES # BLD: 22.9 % (ref 8–40)
MCH RBC QN AUTO: 31.5 PG (ref 25.7–33.7)
MCHC RBC AUTO-ENTMCNC: 33.5 G/DL (ref 32–35.9)
MCV RBC: 94 FL (ref 80–96)
MONOCYTES # BLD AUTO: 10.2 % (ref 3.8–10.2)
NEUTROPHILS # BLD: 64.5 % (ref 42.8–82.8)
PLATELET # BLD AUTO: 237 10^3/UL (ref 134–434)
PMV BLD: 8.6 FL (ref 7.5–11.1)
RBC # BLD AUTO: 4.64 M/MM3 (ref 4–5.6)
WBC # BLD AUTO: 12.7 K/MM3 (ref 4–10)

## 2021-09-01 RX ADMIN — ENOXAPARIN SODIUM SCH MG: 40 INJECTION SUBCUTANEOUS at 09:43

## 2021-09-01 RX ADMIN — LISINOPRIL SCH MG: 5 TABLET ORAL at 09:43

## 2021-09-01 RX ADMIN — CEFAZOLIN SCH MLS/HR: 1 INJECTION, POWDER, FOR SOLUTION INTRAVENOUS at 01:45

## 2021-09-01 RX ADMIN — CEFAZOLIN SCH MLS/HR: 1 INJECTION, POWDER, FOR SOLUTION INTRAVENOUS at 09:42

## 2021-09-01 RX ADMIN — CEFAZOLIN SCH MLS/HR: 1 INJECTION, POWDER, FOR SOLUTION INTRAVENOUS at 17:16

## 2021-09-01 RX ADMIN — KETOCONAZOLE SCH APPLIC: 20 CREAM TOPICAL at 09:43

## 2021-09-02 LAB
BASOPHILS # BLD: 1.2 % (ref 0–2)
DEPRECATED RDW RBC AUTO: 13.3 % (ref 11.9–15.9)
EOSINOPHIL # BLD: 1.3 % (ref 0–4.5)
HCT VFR BLD CALC: 41.9 % (ref 35.4–49)
HGB BLD-MCNC: 14.5 GM/DL (ref 11.7–16.9)
LYMPHOCYTES # BLD: 21.3 % (ref 8–40)
MCH RBC QN AUTO: 32.5 PG (ref 25.7–33.7)
MCHC RBC AUTO-ENTMCNC: 34.5 G/DL (ref 32–35.9)
MCV RBC: 94.3 FL (ref 80–96)
MONOCYTES # BLD AUTO: 6.8 % (ref 3.8–10.2)
NEUTROPHILS # BLD: 69.4 % (ref 42.8–82.8)
PLATELET # BLD AUTO: 234 10^3/UL (ref 134–434)
PMV BLD: 8.8 FL (ref 7.5–11.1)
RBC # BLD AUTO: 4.45 M/MM3 (ref 4–5.6)
WBC # BLD AUTO: 13 K/MM3 (ref 4–10)

## 2021-09-02 RX ADMIN — ENOXAPARIN SODIUM SCH: 40 INJECTION SUBCUTANEOUS at 01:20

## 2021-09-02 RX ADMIN — ENOXAPARIN SODIUM SCH: 40 INJECTION SUBCUTANEOUS at 21:31

## 2021-09-02 RX ADMIN — CEFAZOLIN SCH MLS/HR: 1 INJECTION, POWDER, FOR SOLUTION INTRAVENOUS at 01:16

## 2021-09-02 RX ADMIN — ENOXAPARIN SODIUM SCH: 40 INJECTION SUBCUTANEOUS at 09:20

## 2021-09-02 RX ADMIN — LISINOPRIL SCH MG: 5 TABLET ORAL at 09:44

## 2021-09-02 RX ADMIN — KETOCONAZOLE SCH APPLIC: 20 CREAM TOPICAL at 09:44

## 2021-09-02 RX ADMIN — ACETAMINOPHEN PRN MG: 325 TABLET ORAL at 21:31

## 2021-09-02 RX ADMIN — CEFAZOLIN SCH MLS/HR: 1 INJECTION, POWDER, FOR SOLUTION INTRAVENOUS at 17:33

## 2021-09-02 RX ADMIN — CEFAZOLIN SCH MLS/HR: 1 INJECTION, POWDER, FOR SOLUTION INTRAVENOUS at 09:44

## 2021-09-02 RX ADMIN — ENOXAPARIN SODIUM SCH MG: 40 INJECTION SUBCUTANEOUS at 01:16

## 2021-09-03 VITALS — SYSTOLIC BLOOD PRESSURE: 130 MMHG | HEART RATE: 88 BPM | TEMPERATURE: 97.9 F | DIASTOLIC BLOOD PRESSURE: 79 MMHG

## 2021-09-03 RX ADMIN — LISINOPRIL SCH MG: 5 TABLET ORAL at 09:41

## 2021-09-03 RX ADMIN — CEFAZOLIN SCH MLS/HR: 1 INJECTION, POWDER, FOR SOLUTION INTRAVENOUS at 01:59

## 2021-09-03 RX ADMIN — ENOXAPARIN SODIUM SCH: 40 INJECTION SUBCUTANEOUS at 09:41

## 2021-09-03 RX ADMIN — KETOCONAZOLE SCH APPLIC: 20 CREAM TOPICAL at 09:44

## 2021-09-03 RX ADMIN — CEFAZOLIN SCH MLS/HR: 1 INJECTION, POWDER, FOR SOLUTION INTRAVENOUS at 09:41

## 2022-01-21 ENCOUNTER — HOSPITAL ENCOUNTER (INPATIENT)
Dept: HOSPITAL 74 - JER | Age: 37
LOS: 6 days | Discharge: HOME | DRG: 720 | End: 2022-01-27
Attending: INTERNAL MEDICINE | Admitting: HOSPITALIST
Payer: COMMERCIAL

## 2022-01-21 VITALS — BODY MASS INDEX: 64.5 KG/M2

## 2022-01-21 DIAGNOSIS — B35.3: ICD-10-CM

## 2022-01-21 DIAGNOSIS — F12.90: ICD-10-CM

## 2022-01-21 DIAGNOSIS — E66.01: ICD-10-CM

## 2022-01-21 DIAGNOSIS — D72.829: ICD-10-CM

## 2022-01-21 DIAGNOSIS — A41.9: Primary | ICD-10-CM

## 2022-01-21 DIAGNOSIS — R50.9: ICD-10-CM

## 2022-01-21 DIAGNOSIS — E87.2: ICD-10-CM

## 2022-01-21 DIAGNOSIS — L03.116: ICD-10-CM

## 2022-01-21 DIAGNOSIS — I16.0: ICD-10-CM

## 2022-01-21 DIAGNOSIS — I10: ICD-10-CM

## 2022-01-21 LAB
ALBUMIN SERPL-MCNC: 3.6 G/DL (ref 3.4–5)
ALP SERPL-CCNC: 82 U/L (ref 45–117)
ALT SERPL-CCNC: 43 U/L (ref 13–61)
ANION GAP SERPL CALC-SCNC: 9 MMOL/L (ref 8–16)
ANISOCYTOSIS BLD QL: (no result)
APTT BLD: 29 SECONDS (ref 25.2–36.5)
AST SERPL-CCNC: 43 U/L (ref 15–37)
BILIRUB SERPL-MCNC: 0.6 MG/DL (ref 0.2–1)
BUN SERPL-MCNC: 9.5 MG/DL (ref 7–18)
CALCIUM SERPL-MCNC: 9.5 MG/DL (ref 8.5–10.1)
CHLORIDE SERPL-SCNC: 105 MMOL/L (ref 98–107)
CO2 SERPL-SCNC: 25 MMOL/L (ref 21–32)
CREAT SERPL-MCNC: 0.9 MG/DL (ref 0.55–1.3)
DACRYOCYTES BLD QL SMEAR: (no result)
DEPRECATED RDW RBC AUTO: 13.8 % (ref 11.9–15.9)
GLUCOSE SERPL-MCNC: 118 MG/DL (ref 74–106)
HCT VFR BLD CALC: 44.7 % (ref 35.4–49)
HGB BLD-MCNC: 14.6 GM/DL (ref 11.7–16.9)
INR BLD: 1.17 (ref 0.83–1.09)
MACROCYTES BLD QL: 0
MCH RBC QN AUTO: 30.9 PG (ref 25.7–33.7)
MCHC RBC AUTO-ENTMCNC: 32.7 G/DL (ref 32–35.9)
MCV RBC: 94.4 FL (ref 80–96)
OVALOCYTES BLD QL SMEAR: (no result)
PLATELET # BLD AUTO: 254 10^3/UL (ref 134–434)
PLATELET BLD QL SMEAR: NORMAL
PMV BLD: 8.9 FL (ref 7.5–11.1)
PROT SERPL-MCNC: 8.1 G/DL (ref 6.4–8.2)
PT PNL PPP: 13.5 SEC (ref 9.7–13)
RBC # BLD AUTO: 4.73 M/MM3 (ref 4–5.6)
SODIUM SERPL-SCNC: 138 MMOL/L (ref 136–145)
WBC # BLD AUTO: 28.5 K/MM3 (ref 4–10)

## 2022-01-21 PROCEDURE — C9803 HOPD COVID-19 SPEC COLLECT: HCPCS

## 2022-01-21 PROCEDURE — U0005 INFEC AGEN DETEC AMPLI PROBE: HCPCS

## 2022-01-21 PROCEDURE — U0003 INFECTIOUS AGENT DETECTION BY NUCLEIC ACID (DNA OR RNA); SEVERE ACUTE RESPIRATORY SYNDROME CORONAVIRUS 2 (SARS-COV-2) (CORONAVIRUS DISEASE [COVID-19]), AMPLIFIED PROBE TECHNIQUE, MAKING USE OF HIGH THROUGHPUT TECHNOLOGIES AS DESCRIBED BY CMS-2020-01-R: HCPCS

## 2022-01-22 LAB
APPEARANCE UR: CLEAR
BACTERIA # UR AUTO: 9 /UL (ref 0–1359)
BILIRUB UR STRIP.AUTO-MCNC: NEGATIVE MG/DL
CASTS URNS QL MICRO: 2 /UL (ref 0–3.1)
COLOR UR: YELLOW
EPITH CASTS URNS QL MICRO: 6 /UL (ref 0–25.1)
HIV 1+2 AB+HIV1 P24 AG SERPL QL IA: NEGATIVE
KETONES UR QL STRIP: (no result)
LACTATE SERPL-MCNC: 2.1 MMOL/L (ref 0.4–2)
LEUKOCYTE ESTERASE UR QL STRIP.AUTO: (no result)
NITRITE UR QL STRIP: NEGATIVE
PH UR: 5.5 [PH] (ref 5–8)
PROT UR QL STRIP: NEGATIVE
PROT UR QL STRIP: NEGATIVE
RBC # BLD AUTO: 6 /UL (ref 0–23.9)
SP GR UR: 1.02 (ref 1.01–1.03)
UROBILINOGEN UR STRIP-MCNC: 0.2 MG/DL (ref 0.2–1)
WBC # UR AUTO: 81 /UL (ref 0–25.8)

## 2022-01-22 RX ADMIN — ACETAMINOPHEN PRN MG: 325 TABLET ORAL at 04:30

## 2022-01-22 RX ADMIN — CEFAZOLIN SCH MLS/HR: 1 INJECTION, POWDER, FOR SOLUTION INTRAVENOUS at 17:51

## 2022-01-22 RX ADMIN — IBUPROFEN PRN MG: 600 TABLET, FILM COATED ORAL at 21:26

## 2022-01-22 RX ADMIN — IBUPROFEN PRN MG: 600 TABLET, FILM COATED ORAL at 14:48

## 2022-01-22 RX ADMIN — ENOXAPARIN SODIUM SCH: 40 INJECTION SUBCUTANEOUS at 21:26

## 2022-01-22 RX ADMIN — CEFAZOLIN SCH MLS/HR: 1 INJECTION, POWDER, FOR SOLUTION INTRAVENOUS at 12:37

## 2022-01-22 RX ADMIN — ENOXAPARIN SODIUM SCH MG: 40 INJECTION SUBCUTANEOUS at 12:46

## 2022-01-22 RX ADMIN — NYSTATIN CREAM SCH APPLIC: 100000 CREAM TOPICAL at 21:26

## 2022-01-22 RX ADMIN — SODIUM CHLORIDE SCH MLS/HR: 9 INJECTION, SOLUTION INTRAVENOUS at 12:36

## 2022-01-23 LAB
ALBUMIN SERPL-MCNC: 3 G/DL (ref 3.4–5)
ALP SERPL-CCNC: 66 U/L (ref 45–117)
ALT SERPL-CCNC: 31 U/L (ref 13–61)
ANION GAP SERPL CALC-SCNC: 6 MMOL/L (ref 8–16)
AST SERPL-CCNC: 23 U/L (ref 15–37)
BASOPHILS # BLD: 0.7 % (ref 0–2)
BILIRUB SERPL-MCNC: 0.6 MG/DL (ref 0.2–1)
BUN SERPL-MCNC: 8.4 MG/DL (ref 7–18)
CALCIUM SERPL-MCNC: 8.7 MG/DL (ref 8.5–10.1)
CHLORIDE SERPL-SCNC: 106 MMOL/L (ref 98–107)
CO2 SERPL-SCNC: 27 MMOL/L (ref 21–32)
CREAT SERPL-MCNC: 0.6 MG/DL (ref 0.55–1.3)
DEPRECATED RDW RBC AUTO: 14 % (ref 11.9–15.9)
EOSINOPHIL # BLD: 1.5 % (ref 0–4.5)
GLUCOSE SERPL-MCNC: 112 MG/DL (ref 74–106)
HCT VFR BLD CALC: 39.9 % (ref 35.4–49)
HGB BLD-MCNC: 12.8 GM/DL (ref 11.7–16.9)
LYMPHOCYTES # BLD: 17.1 % (ref 8–40)
MAGNESIUM SERPL-MCNC: 2.2 MG/DL (ref 1.8–2.4)
MCH RBC QN AUTO: 30.4 PG (ref 25.7–33.7)
MCHC RBC AUTO-ENTMCNC: 32.1 G/DL (ref 32–35.9)
MCV RBC: 94.5 FL (ref 80–96)
MONOCYTES # BLD AUTO: 8.9 % (ref 3.8–10.2)
NEUTROPHILS # BLD: 71.8 % (ref 42.8–82.8)
PHOSPHATE SERPL-MCNC: 2.7 MG/DL (ref 2.5–4.9)
PLATELET # BLD AUTO: 189 10^3/UL (ref 134–434)
PMV BLD: 9.4 FL (ref 7.5–11.1)
PROT SERPL-MCNC: 7.1 G/DL (ref 6.4–8.2)
RBC # BLD AUTO: 4.22 M/MM3 (ref 4–5.6)
SODIUM SERPL-SCNC: 139 MMOL/L (ref 136–145)
WBC # BLD AUTO: 12.4 K/MM3 (ref 4–10)

## 2022-01-23 RX ADMIN — IBUPROFEN PRN MG: 600 TABLET, FILM COATED ORAL at 04:55

## 2022-01-23 RX ADMIN — NYSTATIN CREAM SCH APPLIC: 100000 CREAM TOPICAL at 09:38

## 2022-01-23 RX ADMIN — CEFAZOLIN SCH MLS/HR: 1 INJECTION, POWDER, FOR SOLUTION INTRAVENOUS at 01:28

## 2022-01-23 RX ADMIN — CEFAZOLIN SCH MLS/HR: 1 INJECTION, POWDER, FOR SOLUTION INTRAVENOUS at 17:56

## 2022-01-23 RX ADMIN — IBUPROFEN PRN MG: 600 TABLET, FILM COATED ORAL at 21:26

## 2022-01-23 RX ADMIN — NYSTATIN CREAM SCH APPLIC: 100000 CREAM TOPICAL at 21:27

## 2022-01-23 RX ADMIN — ENOXAPARIN SODIUM SCH: 40 INJECTION SUBCUTANEOUS at 10:00

## 2022-01-23 RX ADMIN — SODIUM CHLORIDE SCH MLS/HR: 9 INJECTION, SOLUTION INTRAVENOUS at 09:37

## 2022-01-23 RX ADMIN — ENOXAPARIN SODIUM SCH: 40 INJECTION SUBCUTANEOUS at 21:28

## 2022-01-23 RX ADMIN — ENOXAPARIN SODIUM SCH MG: 40 INJECTION SUBCUTANEOUS at 09:35

## 2022-01-23 RX ADMIN — IBUPROFEN PRN MG: 600 TABLET, FILM COATED ORAL at 16:09

## 2022-01-23 RX ADMIN — CEFAZOLIN SCH MLS/HR: 1 INJECTION, POWDER, FOR SOLUTION INTRAVENOUS at 09:49

## 2022-01-24 LAB
ALBUMIN SERPL-MCNC: 2.9 G/DL (ref 3.4–5)
ALP SERPL-CCNC: 61 U/L (ref 45–117)
ALT SERPL-CCNC: 25 U/L (ref 13–61)
ANION GAP SERPL CALC-SCNC: 5 MMOL/L (ref 8–16)
AST SERPL-CCNC: 18 U/L (ref 15–37)
BASOPHILS # BLD: 0.6 % (ref 0–2)
BILIRUB SERPL-MCNC: 0.4 MG/DL (ref 0.2–1)
BUN SERPL-MCNC: 9.1 MG/DL (ref 7–18)
CALCIUM SERPL-MCNC: 8.7 MG/DL (ref 8.5–10.1)
CHLORIDE SERPL-SCNC: 105 MMOL/L (ref 98–107)
CO2 SERPL-SCNC: 29 MMOL/L (ref 21–32)
CREAT SERPL-MCNC: 0.7 MG/DL (ref 0.55–1.3)
DEPRECATED RDW RBC AUTO: 13.9 % (ref 11.9–15.9)
EOSINOPHIL # BLD: 1.3 % (ref 0–4.5)
GLUCOSE SERPL-MCNC: 127 MG/DL (ref 74–106)
HCT VFR BLD CALC: 39.6 % (ref 35.4–49)
HGB BLD-MCNC: 12.7 GM/DL (ref 11.7–16.9)
LYMPHOCYTES # BLD: 16.8 % (ref 8–40)
MCH RBC QN AUTO: 30.3 PG (ref 25.7–33.7)
MCHC RBC AUTO-ENTMCNC: 32.1 G/DL (ref 32–35.9)
MCV RBC: 94.5 FL (ref 80–96)
MONOCYTES # BLD AUTO: 8.7 % (ref 3.8–10.2)
NEUTROPHILS # BLD: 72.6 % (ref 42.8–82.8)
PLATELET # BLD AUTO: 218 10^3/UL (ref 134–434)
PMV BLD: 9.7 FL (ref 7.5–11.1)
PROT SERPL-MCNC: 6.8 G/DL (ref 6.4–8.2)
RBC # BLD AUTO: 4.19 M/MM3 (ref 4–5.6)
SODIUM SERPL-SCNC: 139 MMOL/L (ref 136–145)
WBC # BLD AUTO: 11.2 K/MM3 (ref 4–10)

## 2022-01-24 RX ADMIN — CEFAZOLIN SCH MLS/HR: 1 INJECTION, POWDER, FOR SOLUTION INTRAVENOUS at 02:20

## 2022-01-24 RX ADMIN — ACETAMINOPHEN PRN MG: 325 TABLET ORAL at 17:46

## 2022-01-24 RX ADMIN — SODIUM CHLORIDE SCH: 9 INJECTION, SOLUTION INTRAVENOUS at 10:03

## 2022-01-24 RX ADMIN — NYSTATIN CREAM SCH APPLIC: 100000 CREAM TOPICAL at 21:12

## 2022-01-24 RX ADMIN — NYSTATIN CREAM SCH APPLIC: 100000 CREAM TOPICAL at 10:03

## 2022-01-24 RX ADMIN — CEFAZOLIN SCH MLS/HR: 1 INJECTION, POWDER, FOR SOLUTION INTRAVENOUS at 10:02

## 2022-01-24 RX ADMIN — KETOROLAC TROMETHAMINE PRN MG: 15 INJECTION, SOLUTION INTRAMUSCULAR; INTRAVENOUS at 16:06

## 2022-01-24 RX ADMIN — ENOXAPARIN SODIUM SCH: 40 INJECTION SUBCUTANEOUS at 21:12

## 2022-01-24 RX ADMIN — SODIUM CHLORIDE SCH MLS/HR: 9 INJECTION, SOLUTION INTRAVENOUS at 16:06

## 2022-01-24 RX ADMIN — HYDROCHLOROTHIAZIDE SCH MG: 12.5 CAPSULE ORAL at 20:11

## 2022-01-24 RX ADMIN — ENOXAPARIN SODIUM SCH: 40 INJECTION SUBCUTANEOUS at 10:03

## 2022-01-24 RX ADMIN — CEFAZOLIN SCH MLS/HR: 1 INJECTION, POWDER, FOR SOLUTION INTRAVENOUS at 17:46

## 2022-01-25 RX ADMIN — CEFAZOLIN SCH MLS/HR: 1 INJECTION, POWDER, FOR SOLUTION INTRAVENOUS at 10:30

## 2022-01-25 RX ADMIN — KETOROLAC TROMETHAMINE PRN MG: 15 INJECTION, SOLUTION INTRAMUSCULAR; INTRAVENOUS at 23:23

## 2022-01-25 RX ADMIN — HYDROCHLOROTHIAZIDE SCH MG: 12.5 CAPSULE ORAL at 10:30

## 2022-01-25 RX ADMIN — NYSTATIN CREAM SCH APPLIC: 100000 CREAM TOPICAL at 21:21

## 2022-01-25 RX ADMIN — CEFAZOLIN SCH MLS/HR: 1 INJECTION, POWDER, FOR SOLUTION INTRAVENOUS at 17:24

## 2022-01-25 RX ADMIN — CEFAZOLIN SCH MLS/HR: 1 INJECTION, POWDER, FOR SOLUTION INTRAVENOUS at 03:00

## 2022-01-25 RX ADMIN — ENOXAPARIN SODIUM SCH: 40 INJECTION SUBCUTANEOUS at 10:30

## 2022-01-25 RX ADMIN — ENOXAPARIN SODIUM SCH: 40 INJECTION SUBCUTANEOUS at 21:20

## 2022-01-25 RX ADMIN — KETOROLAC TROMETHAMINE PRN MG: 15 INJECTION, SOLUTION INTRAMUSCULAR; INTRAVENOUS at 17:25

## 2022-01-25 RX ADMIN — NYSTATIN CREAM SCH APPLIC: 100000 CREAM TOPICAL at 10:30

## 2022-01-26 LAB
ANION GAP SERPL CALC-SCNC: 9 MMOL/L (ref 8–16)
BASOPHILS # BLD: 1.2 % (ref 0–2)
BUN SERPL-MCNC: 10.2 MG/DL (ref 7–18)
CALCIUM SERPL-MCNC: 9.2 MG/DL (ref 8.5–10.1)
CHLORIDE SERPL-SCNC: 103 MMOL/L (ref 98–107)
CO2 SERPL-SCNC: 25 MMOL/L (ref 21–32)
CREAT SERPL-MCNC: 0.7 MG/DL (ref 0.55–1.3)
DEPRECATED RDW RBC AUTO: 13.9 % (ref 11.9–15.9)
EOSINOPHIL # BLD: 0.9 % (ref 0–4.5)
GLUCOSE SERPL-MCNC: 153 MG/DL (ref 74–106)
HCT VFR BLD CALC: 42.2 % (ref 35.4–49)
HGB BLD-MCNC: 14.1 GM/DL (ref 11.7–16.9)
LYMPHOCYTES # BLD: 19 % (ref 8–40)
MAGNESIUM SERPL-MCNC: 2.3 MG/DL (ref 1.8–2.4)
MCH RBC QN AUTO: 31.3 PG (ref 25.7–33.7)
MCHC RBC AUTO-ENTMCNC: 33.5 G/DL (ref 32–35.9)
MCV RBC: 93.3 FL (ref 80–96)
MONOCYTES # BLD AUTO: 7.1 % (ref 3.8–10.2)
NEUTROPHILS # BLD: 71.8 % (ref 42.8–82.8)
PHOSPHATE SERPL-MCNC: 3.1 MG/DL (ref 2.5–4.9)
PLATELET # BLD AUTO: 254 10^3/UL (ref 134–434)
PMV BLD: 8.8 FL (ref 7.5–11.1)
RBC # BLD AUTO: 4.52 M/MM3 (ref 4–5.6)
SODIUM SERPL-SCNC: 137 MMOL/L (ref 136–145)
WBC # BLD AUTO: 13 K/MM3 (ref 4–10)

## 2022-01-26 RX ADMIN — KETOROLAC TROMETHAMINE PRN MG: 15 INJECTION, SOLUTION INTRAMUSCULAR; INTRAVENOUS at 06:11

## 2022-01-26 RX ADMIN — GABAPENTIN SCH MG: 100 CAPSULE ORAL at 12:42

## 2022-01-26 RX ADMIN — CEFAZOLIN SCH MLS/HR: 1 INJECTION, POWDER, FOR SOLUTION INTRAVENOUS at 09:22

## 2022-01-26 RX ADMIN — NYSTATIN CREAM SCH APPLIC: 100000 CREAM TOPICAL at 09:23

## 2022-01-26 RX ADMIN — ACETAMINOPHEN PRN MG: 325 TABLET ORAL at 09:22

## 2022-01-26 RX ADMIN — NYSTATIN CREAM SCH APPLIC: 100000 CREAM TOPICAL at 21:35

## 2022-01-26 RX ADMIN — ACETAMINOPHEN PRN MG: 325 TABLET ORAL at 21:36

## 2022-01-26 RX ADMIN — CEFAZOLIN SCH MLS/HR: 1 INJECTION, POWDER, FOR SOLUTION INTRAVENOUS at 02:15

## 2022-01-26 RX ADMIN — KETOROLAC TROMETHAMINE PRN MG: 15 INJECTION, SOLUTION INTRAMUSCULAR; INTRAVENOUS at 12:42

## 2022-01-26 RX ADMIN — HYDROCHLOROTHIAZIDE SCH MG: 12.5 CAPSULE ORAL at 09:22

## 2022-01-26 RX ADMIN — ENOXAPARIN SODIUM SCH: 40 INJECTION SUBCUTANEOUS at 09:19

## 2022-01-26 RX ADMIN — KETOROLAC TROMETHAMINE PRN MG: 15 INJECTION, SOLUTION INTRAMUSCULAR; INTRAVENOUS at 19:01

## 2022-01-26 RX ADMIN — GABAPENTIN SCH MG: 100 CAPSULE ORAL at 21:35

## 2022-01-26 RX ADMIN — ENOXAPARIN SODIUM SCH: 40 INJECTION SUBCUTANEOUS at 21:34

## 2022-01-26 RX ADMIN — LISINOPRIL SCH MG: 10 TABLET ORAL at 09:22

## 2022-01-26 RX ADMIN — CEFAZOLIN SCH MLS/HR: 1 INJECTION, POWDER, FOR SOLUTION INTRAVENOUS at 17:27

## 2022-01-27 VITALS — TEMPERATURE: 98.2 F | HEART RATE: 93 BPM | SYSTOLIC BLOOD PRESSURE: 136 MMHG | DIASTOLIC BLOOD PRESSURE: 86 MMHG

## 2022-01-27 LAB
ANION GAP SERPL CALC-SCNC: 6 MMOL/L (ref 8–16)
BASOPHILS # BLD: 0.9 % (ref 0–2)
BUN SERPL-MCNC: 13 MG/DL (ref 7–18)
CALCIUM SERPL-MCNC: 8.9 MG/DL (ref 8.5–10.1)
CHLORIDE SERPL-SCNC: 104 MMOL/L (ref 98–107)
CO2 SERPL-SCNC: 27 MMOL/L (ref 21–32)
CREAT SERPL-MCNC: 0.7 MG/DL (ref 0.55–1.3)
DEPRECATED RDW RBC AUTO: 13.8 % (ref 11.9–15.9)
EOSINOPHIL # BLD: 1.1 % (ref 0–4.5)
GLUCOSE SERPL-MCNC: 116 MG/DL (ref 74–106)
HCT VFR BLD CALC: 41.8 % (ref 35.4–49)
HGB BLD-MCNC: 14 GM/DL (ref 11.7–16.9)
LYMPHOCYTES # BLD: 19.3 % (ref 8–40)
MCH RBC QN AUTO: 31.2 PG (ref 25.7–33.7)
MCHC RBC AUTO-ENTMCNC: 33.5 G/DL (ref 32–35.9)
MCV RBC: 93.2 FL (ref 80–96)
MONOCYTES # BLD AUTO: 8.4 % (ref 3.8–10.2)
NEUTROPHILS # BLD: 70.3 % (ref 42.8–82.8)
PLATELET # BLD AUTO: 226 10^3/UL (ref 134–434)
PMV BLD: 8.7 FL (ref 7.5–11.1)
RBC # BLD AUTO: 4.48 M/MM3 (ref 4–5.6)
SODIUM SERPL-SCNC: 138 MMOL/L (ref 136–145)
WBC # BLD AUTO: 11.4 K/MM3 (ref 4–10)

## 2022-01-27 RX ADMIN — LISINOPRIL SCH MG: 10 TABLET ORAL at 09:30

## 2022-01-27 RX ADMIN — CEFAZOLIN SCH MLS/HR: 1 INJECTION, POWDER, FOR SOLUTION INTRAVENOUS at 02:20

## 2022-01-27 RX ADMIN — ENOXAPARIN SODIUM SCH: 40 INJECTION SUBCUTANEOUS at 09:32

## 2022-01-27 RX ADMIN — NYSTATIN CREAM SCH APPLIC: 100000 CREAM TOPICAL at 09:41

## 2022-01-27 RX ADMIN — CEFAZOLIN SCH MLS/HR: 1 INJECTION, POWDER, FOR SOLUTION INTRAVENOUS at 09:31

## 2022-01-27 RX ADMIN — ACETAMINOPHEN PRN MG: 325 TABLET ORAL at 09:30

## 2022-01-27 RX ADMIN — GABAPENTIN SCH MG: 100 CAPSULE ORAL at 09:30

## 2022-09-06 NOTE — ED PROVIDER NOTE - CRANIAL NERVE AND PUPILLARY EXAM
Addended by: TRACY MARCH on: 9/6/2022 02:50 PM     Modules accepted: Orders     cranial nerves 2-12 intact

## 2024-06-02 ENCOUNTER — HOSPITAL ENCOUNTER (EMERGENCY)
Dept: HOSPITAL 74 - JER | Age: 39
Discharge: HOME | End: 2024-06-02
Payer: COMMERCIAL

## 2024-06-02 VITALS — RESPIRATION RATE: 18 BRPM | TEMPERATURE: 98.7 F

## 2024-06-02 VITALS — DIASTOLIC BLOOD PRESSURE: 79 MMHG | SYSTOLIC BLOOD PRESSURE: 132 MMHG | HEART RATE: 82 BPM

## 2024-06-02 VITALS — BODY MASS INDEX: 44.7 KG/M2

## 2024-06-02 DIAGNOSIS — J03.90: Primary | ICD-10-CM

## 2024-06-02 DIAGNOSIS — Z20.822: ICD-10-CM

## 2024-06-02 DIAGNOSIS — R50.9: ICD-10-CM

## 2024-06-02 LAB
BASOPHILS # BLD: 0.7 % (ref 0–2)
DEPRECATED RDW RBC AUTO: 12.4 % (ref 11.9–15.9)
EOSINOPHIL # BLD: 1.4 % (ref 0–4.5)
HCT VFR BLD CALC: 43.9 % (ref 35.4–49)
HGB BLD-MCNC: 15.2 GM/DL (ref 11.7–16.9)
LYMPHOCYTES # BLD: 19 % (ref 8–40)
MCH RBC QN AUTO: 33.1 PG (ref 25.7–33.7)
MCHC RBC AUTO-ENTMCNC: 34.5 G/DL (ref 32–35.9)
MCV RBC: 95.8 FL (ref 80–96)
MONOCYTES # BLD AUTO: 15.5 % (ref 3.8–10.2)
NEUTROPHILS # BLD: 63.4 % (ref 42.8–82.8)
PLATELET # BLD AUTO: 193 10^3/UL (ref 134–434)
PMV BLD: 8.2 FL (ref 7.5–11.1)
RBC # BLD AUTO: 4.58 M/MM3 (ref 4–5.6)
WBC # BLD AUTO: 9.3 K/MM3 (ref 4–10)

## 2024-06-02 PROCEDURE — 3E023GC INTRODUCTION OF OTHER THERAPEUTIC SUBSTANCE INTO MUSCLE, PERCUTANEOUS APPROACH: ICD-10-PCS

## 2024-06-02 RX ADMIN — ACETAMINOPHEN ONE: 10 INJECTION, SOLUTION INTRAVENOUS at 09:53

## 2024-06-02 RX ADMIN — DEXAMETHASONE SODIUM PHOSPHATE ONE MG: 10 INJECTION, SOLUTION INTRAMUSCULAR; INTRAVENOUS at 09:52

## 2024-06-02 RX ADMIN — ACETAMINOPHEN ONE MG: 500 TABLET, FILM COATED ORAL at 09:52

## 2024-06-03 LAB — EBV VCA IGM SER-ACNC: <36 U/ML (ref 0–35.9)

## 2025-05-03 ENCOUNTER — HOSPITAL ENCOUNTER (OUTPATIENT)
Dept: HOSPITAL 74 - JER | Age: 40
Setting detail: OBSERVATION
LOS: 2 days | Discharge: HOME | End: 2025-05-05
Attending: STUDENT IN AN ORGANIZED HEALTH CARE EDUCATION/TRAINING PROGRAM | Admitting: HOSPITALIST
Payer: COMMERCIAL

## 2025-05-03 DIAGNOSIS — E66.01: ICD-10-CM

## 2025-05-03 DIAGNOSIS — B35.3: ICD-10-CM

## 2025-05-03 DIAGNOSIS — I10: ICD-10-CM

## 2025-05-03 DIAGNOSIS — L03.116: Primary | ICD-10-CM

## 2025-05-03 DIAGNOSIS — E78.5: ICD-10-CM

## 2025-05-03 LAB
ALBUMIN SERPL-MCNC: 3.9 G/DL (ref 3.4–5)
APPEARANCE UR: CLEAR
APTT BLD: 25.3 SECONDS (ref 25.2–36.5)
BASE EXCESS BLDV CALC-SCNC: -1.6 MMOL/L (ref -2–2)
BILIRUB SERPL-MCNC: 1.2 MG/DL (ref 0.2–1)
BILIRUB UR STRIP.AUTO-MCNC: NEGATIVE MG/DL
BUN SERPL-MCNC: 11.7 MG/DL (ref 7–18)
CALCIUM SERPL-MCNC: 9.5 MG/DL (ref 8.5–10.1)
COLOR UR: YELLOW
CREAT SERPL-MCNC: 1.1 MG/DL (ref 0.55–1.3)
ERYTHROCYTE [SEDIMENTATION RATE] IN BLOOD BY WESTERGREN METHOD: 2 MM/HR (ref 0–10)
HCT VFR BLD CALC: 47.4 % (ref 40.1–51)
HGB BLD-MCNC: 15.6 G/DL (ref 13.7–17.5)
INR BLD: 1.12 (ref 0.83–1.09)
KETONES UR QL STRIP: NEGATIVE
LACTATE SERPL-MCNC: 2.6 MMOL/L (ref 0.4–2)
LACTATE SERPL-MCNC: 4.3 MMOL/L (ref 0.4–2)
LEUKOCYTE ESTERASE UR QL STRIP.AUTO: NEGATIVE
MCHC RBC-ENTMCNC: 32.9 G/DL (ref 32.3–36.5)
MCV RBC: 101.3 FL (ref 79–92.2)
NITRITE UR QL STRIP: NEGATIVE
PCO2 BLDV: 36.5 MMHG (ref 38–52)
PH BLDV: 7.41 [PH] (ref 7.31–7.41)
PLATELET # BLD AUTO: 212 X10^3/UL (ref 163–337)
PMV BLD: 10.5 FL (ref 9.4–12.4)
POTASSIUM SERPLBLD-SCNC: 3.4 MMOL/L (ref 3.5–5.1)
PROT SERPL-MCNC: 7.8 G/DL (ref 6.4–8.2)
PROT UR QL STRIP: NEGATIVE
PROT UR QL STRIP: NEGATIVE
PT PNL PPP: 12.2 SEC (ref 9.7–13)
SAO2 % BLDV: 86.9 % (ref 70–80)
SP GR UR: 1.02 (ref 1.01–1.03)

## 2025-05-03 PROCEDURE — 3E023GC INTRODUCTION OF OTHER THERAPEUTIC SUBSTANCE INTO MUSCLE, PERCUTANEOUS APPROACH: ICD-10-PCS | Performed by: STUDENT IN AN ORGANIZED HEALTH CARE EDUCATION/TRAINING PROGRAM

## 2025-05-03 PROCEDURE — 3E03329 INTRODUCTION OF OTHER ANTI-INFECTIVE INTO PERIPHERAL VEIN, PERCUTANEOUS APPROACH: ICD-10-PCS | Performed by: STUDENT IN AN ORGANIZED HEALTH CARE EDUCATION/TRAINING PROGRAM

## 2025-05-03 PROCEDURE — 3E033NZ INTRODUCTION OF ANALGESICS, HYPNOTICS, SEDATIVES INTO PERIPHERAL VEIN, PERCUTANEOUS APPROACH: ICD-10-PCS | Performed by: STUDENT IN AN ORGANIZED HEALTH CARE EDUCATION/TRAINING PROGRAM

## 2025-05-03 PROCEDURE — G0378 HOSPITAL OBSERVATION PER HR: HCPCS

## 2025-05-03 RX ADMIN — IBUPROFEN PRN MG: 600 TABLET, FILM COATED ORAL at 23:05

## 2025-05-03 RX ADMIN — ACETAMINOPHEN ONE MG: 10 INJECTION, SOLUTION INTRAVENOUS at 15:50

## 2025-05-03 RX ADMIN — CEFAZOLIN ONE: 225 INJECTION, POWDER, FOR SOLUTION INTRAMUSCULAR; INTRAVENOUS at 18:55

## 2025-05-03 RX ADMIN — SODIUM CHLORIDE ONE ML: 9 INJECTION, SOLUTION INTRAVENOUS at 19:25

## 2025-05-03 RX ADMIN — GABAPENTIN SCH MG: 100 CAPSULE ORAL at 21:33

## 2025-05-03 RX ADMIN — AMPICILLIN SODIUM AND SULBACTAM SODIUM SCH MLS/HR: 2; 1 INJECTION, POWDER, FOR SOLUTION INTRAMUSCULAR; INTRAVENOUS at 21:03

## 2025-05-03 RX ADMIN — POTASSIUM CHLORIDE ONE MEQ: 20 SOLUTION ORAL at 17:25

## 2025-05-03 RX ADMIN — VANCOMYCIN HYDROCHLORIDE ONE MLS/HR: 1 INJECTION, POWDER, LYOPHILIZED, FOR SOLUTION INTRAVENOUS at 16:56

## 2025-05-03 RX ADMIN — SODIUM CHLORIDE SCH MLS/HR: 9 INJECTION, SOLUTION INTRAVENOUS at 21:03

## 2025-05-03 RX ADMIN — FAMOTIDINE ONE MLS/HR: 20 INJECTION, SOLUTION INTRAVENOUS at 21:33

## 2025-05-03 RX ADMIN — ACETAMINOPHEN ONE: 10 INJECTION, SOLUTION INTRAVENOUS at 18:55

## 2025-05-04 VITALS — BODY MASS INDEX: 42.7 KG/M2

## 2025-05-04 VITALS — RESPIRATION RATE: 18 BRPM

## 2025-05-04 LAB
BILIRUB SERPL-MCNC: 1.2 MG/DL (ref 0.2–1)
BUN SERPL-MCNC: 10.8 MG/DL (ref 7–18)
CREAT SERPL-MCNC: 0.7 MG/DL (ref 0.55–1.3)
ERYTHROCYTE [DISTWIDTH] IN BLOOD: 12.2 % (ref 12–15.6)
HCT VFR BLD CALC: 42.1 % (ref 40.1–51)
HGB BLD-MCNC: 13.8 G/DL (ref 13.7–17.5)
MCHC RBC-ENTMCNC: 32.8 G/DL (ref 32.3–36.5)
MCV RBC: 102.2 FL (ref 79–92.2)
PLATELET # BLD AUTO: 176 X10^3/UL (ref 163–337)
PMV BLD: 10.6 FL (ref 9.4–12.4)
POTASSIUM SERPLBLD-SCNC: 3.6 MMOL/L (ref 3.5–5.1)
PROT SERPL-MCNC: 6.2 G/DL (ref 6.4–8.2)

## 2025-05-04 RX ADMIN — ASPIRIN SCH MG: 81 TABLET, COATED ORAL at 09:28

## 2025-05-04 RX ADMIN — LISINOPRIL SCH MG: 20 TABLET ORAL at 09:27

## 2025-05-04 RX ADMIN — ACETAMINOPHEN PRN MG: 10 INJECTION, SOLUTION INTRAVENOUS at 00:19

## 2025-05-04 RX ADMIN — FAMOTIDINE SCH MG: 20 TABLET ORAL at 11:02

## 2025-05-04 RX ADMIN — ENOXAPARIN SODIUM SCH MG: 40 INJECTION SUBCUTANEOUS at 09:27

## 2025-05-05 VITALS — HEART RATE: 78 BPM | TEMPERATURE: 99 F | DIASTOLIC BLOOD PRESSURE: 85 MMHG | SYSTOLIC BLOOD PRESSURE: 157 MMHG

## 2025-05-05 LAB
BASOPHILS # BLD AUTO: 0.09 X10^3/UL (ref 0.01–0.08)
BUN SERPL-MCNC: 8.6 MG/DL (ref 7–18)
CREAT SERPL-MCNC: 0.7 MG/DL (ref 0.55–1.3)
EOSINOPHIL # BLD AUTO: 0.08 X10^3/UL (ref 0.04–0.54)
EOSINOPHIL NFR BLD AUTO: 0.7 % (ref 0.8–7)
ERYTHROCYTE [DISTWIDTH] IN BLOOD: 12.1 % (ref 12–15.6)
HGB BLD-MCNC: 13.6 G/DL (ref 13.7–17.5)
IMM GRANULOCYTES # BLD: 0.06 X10^3/UL (ref 0–0.03)
MCHC RBC-ENTMCNC: 32.4 G/DL (ref 32.3–36.5)
MCV RBC: 101.7 FL (ref 79–92.2)
MONOCYTES # BLD AUTO: 0.86 X10^3/UL (ref 0.3–0.82)
MONOCYTES NFR BLD AUTO: 7.7 % (ref 5.3–12.2)
PLATELET # BLD AUTO: 191 X10^3/UL (ref 163–337)
PMV BLD: 10.7 FL (ref 9.4–12.4)
POTASSIUM SERPLBLD-SCNC: 3.6 MMOL/L (ref 3.5–5.1)

## 2025-05-23 ENCOUNTER — HOSPITAL ENCOUNTER (EMERGENCY)
Dept: HOSPITAL 74 - JERFT | Age: 40
Discharge: HOME | End: 2025-05-23
Payer: COMMERCIAL

## 2025-05-23 VITALS
SYSTOLIC BLOOD PRESSURE: 133 MMHG | RESPIRATION RATE: 18 BRPM | DIASTOLIC BLOOD PRESSURE: 75 MMHG | BODY MASS INDEX: 41.3 KG/M2 | TEMPERATURE: 98 F | HEART RATE: 100 BPM

## 2025-05-23 DIAGNOSIS — M54.50: Primary | ICD-10-CM

## 2025-05-23 LAB
APPEARANCE UR: CLEAR
BILIRUB UR STRIP.AUTO-MCNC: NEGATIVE MG/DL
COLOR UR: YELLOW
KETONES UR QL STRIP: (no result)
LEUKOCYTE ESTERASE UR QL STRIP.AUTO: NEGATIVE
NITRITE UR QL STRIP: NEGATIVE
PH UR: 6.5 [PH] (ref 5–8)
PROT UR QL STRIP: NEGATIVE
PROT UR QL STRIP: NEGATIVE
SP GR UR: 1.02 (ref 1.01–1.03)

## 2025-05-23 PROCEDURE — 3E0333Z INTRODUCTION OF ANTI-INFLAMMATORY INTO PERIPHERAL VEIN, PERCUTANEOUS APPROACH: ICD-10-PCS | Performed by: EMERGENCY MEDICINE

## 2025-05-23 RX ADMIN — ACETAMINOPHEN ONE MG: 500 TABLET, FILM COATED ORAL at 12:43

## 2025-05-23 RX ADMIN — KETOROLAC TROMETHAMINE ONE MG: 30 INJECTION INTRAMUSCULAR; INTRAVENOUS at 12:42

## 2025-05-23 RX ADMIN — LIDOCAINE ONE PATCH: 50 PATCH TOPICAL at 12:53
